# Patient Record
Sex: FEMALE | Race: WHITE | NOT HISPANIC OR LATINO | Employment: OTHER | URBAN - METROPOLITAN AREA
[De-identification: names, ages, dates, MRNs, and addresses within clinical notes are randomized per-mention and may not be internally consistent; named-entity substitution may affect disease eponyms.]

---

## 2024-06-09 ENCOUNTER — APPOINTMENT (OUTPATIENT)
Dept: RADIOLOGY | Facility: HOSPITAL | Age: 74
DRG: 087 | End: 2024-06-09
Payer: MEDICARE

## 2024-06-09 ENCOUNTER — HOSPITAL ENCOUNTER (INPATIENT)
Facility: HOSPITAL | Age: 74
LOS: 1 days | Discharge: NURSING, REHAB OR LONG-TERM CARE | DRG: 087 | End: 2024-06-11
Attending: EMERGENCY MEDICINE | Admitting: INTERNAL MEDICINE
Payer: MEDICARE

## 2024-06-09 ENCOUNTER — APPOINTMENT (OUTPATIENT)
Dept: CARDIOLOGY | Facility: HOSPITAL | Age: 74
DRG: 087 | End: 2024-06-09
Payer: MEDICARE

## 2024-06-09 DIAGNOSIS — S01.01XA LACERATION OF SCALP, INITIAL ENCOUNTER: ICD-10-CM

## 2024-06-09 DIAGNOSIS — S02.19XA CLOSED FRACTURE OF TEMPORAL BONE, INITIAL ENCOUNTER (MULTI): ICD-10-CM

## 2024-06-09 DIAGNOSIS — S01.81XA CHIN LACERATION, INITIAL ENCOUNTER: ICD-10-CM

## 2024-06-09 DIAGNOSIS — S02.119A FRACTURE OF OCCIPITAL BONE OF SKULL WITH LOSS OF CONSCIOUSNESS (MULTI): ICD-10-CM

## 2024-06-09 DIAGNOSIS — R55 SYNCOPE, UNSPECIFIED SYNCOPE TYPE: ICD-10-CM

## 2024-06-09 DIAGNOSIS — S06.5X1A: ICD-10-CM

## 2024-06-09 DIAGNOSIS — S06.6X1S: ICD-10-CM

## 2024-06-09 DIAGNOSIS — S06.9X9A FRACTURE OF OCCIPITAL BONE OF SKULL WITH LOSS OF CONSCIOUSNESS (MULTI): ICD-10-CM

## 2024-06-09 DIAGNOSIS — I10 PRIMARY HYPERTENSION: ICD-10-CM

## 2024-06-09 DIAGNOSIS — S06.6X1A TRAUMATIC SUBARACHNOID HEMORRHAGE WITH LOSS OF CONSCIOUSNESS OF 30 MINUTES OR LESS, INITIAL ENCOUNTER (MULTI): Primary | ICD-10-CM

## 2024-06-09 LAB
ABO GROUP (TYPE) IN BLOOD: NORMAL
ALBUMIN SERPL BCP-MCNC: 3.1 G/DL (ref 3.4–5)
ALP SERPL-CCNC: 51 U/L (ref 33–136)
ALT SERPL W P-5'-P-CCNC: 12 U/L (ref 7–45)
ANION GAP SERPL CALC-SCNC: 12 MMOL/L (ref 10–20)
ANTIBODY SCREEN: NORMAL
AST SERPL W P-5'-P-CCNC: 16 U/L (ref 9–39)
BASOPHILS # BLD AUTO: 0.06 X10*3/UL (ref 0–0.1)
BASOPHILS NFR BLD AUTO: 0.5 %
BILIRUB SERPL-MCNC: 0.3 MG/DL (ref 0–1.2)
BUN SERPL-MCNC: 24 MG/DL (ref 6–23)
CALCIUM SERPL-MCNC: 8.5 MG/DL (ref 8.6–10.3)
CARDIAC TROPONIN I PNL SERPL HS: 4 NG/L (ref 0–13)
CHLORIDE SERPL-SCNC: 105 MMOL/L (ref 98–107)
CO2 SERPL-SCNC: 23 MMOL/L (ref 21–32)
CREAT SERPL-MCNC: 1.08 MG/DL (ref 0.5–1.05)
EGFRCR SERPLBLD CKD-EPI 2021: 54 ML/MIN/1.73M*2
EOSINOPHIL # BLD AUTO: 0.27 X10*3/UL (ref 0–0.4)
EOSINOPHIL NFR BLD AUTO: 2.2 %
ERYTHROCYTE [DISTWIDTH] IN BLOOD BY AUTOMATED COUNT: 15.7 % (ref 11.5–14.5)
ETHANOL SERPL-MCNC: <10 MG/DL
GLUCOSE BLD MANUAL STRIP-MCNC: 161 MG/DL (ref 74–99)
GLUCOSE BLD MANUAL STRIP-MCNC: 213 MG/DL (ref 74–99)
GLUCOSE SERPL-MCNC: 174 MG/DL (ref 74–99)
HCT VFR BLD AUTO: 32.1 % (ref 36–46)
HGB BLD-MCNC: 9.9 G/DL (ref 12–16)
IMM GRANULOCYTES # BLD AUTO: 0.11 X10*3/UL (ref 0–0.5)
IMM GRANULOCYTES NFR BLD AUTO: 0.9 % (ref 0–0.9)
INR PPP: 1.1 (ref 0.9–1.1)
LACTATE SERPL-SCNC: 1.9 MMOL/L (ref 0.4–2)
LACTATE SERPL-SCNC: 3 MMOL/L (ref 0.4–2)
LYMPHOCYTES # BLD AUTO: 1.28 X10*3/UL (ref 0.8–3)
LYMPHOCYTES NFR BLD AUTO: 10.3 %
MCH RBC QN AUTO: 26.3 PG (ref 26–34)
MCHC RBC AUTO-ENTMCNC: 30.8 G/DL (ref 32–36)
MCV RBC AUTO: 85 FL (ref 80–100)
MONOCYTES # BLD AUTO: 0.86 X10*3/UL (ref 0.05–0.8)
MONOCYTES NFR BLD AUTO: 6.9 %
NEUTROPHILS # BLD AUTO: 9.86 X10*3/UL (ref 1.6–5.5)
NEUTROPHILS NFR BLD AUTO: 79.2 %
NRBC BLD-RTO: 0 /100 WBCS (ref 0–0)
PLATELET # BLD AUTO: 215 X10*3/UL (ref 150–450)
POTASSIUM SERPL-SCNC: 4.4 MMOL/L (ref 3.5–5.3)
PROT SERPL-MCNC: 5.4 G/DL (ref 6.4–8.2)
PROTHROMBIN TIME: 12.3 SECONDS (ref 9.8–12.8)
RBC # BLD AUTO: 3.76 X10*6/UL (ref 4–5.2)
RH FACTOR (ANTIGEN D): NORMAL
SODIUM SERPL-SCNC: 136 MMOL/L (ref 136–145)
WBC # BLD AUTO: 12.4 X10*3/UL (ref 4.4–11.3)

## 2024-06-09 PROCEDURE — 72131 CT LUMBAR SPINE W/O DYE: CPT

## 2024-06-09 PROCEDURE — 72131 CT LUMBAR SPINE W/O DYE: CPT | Performed by: RADIOLOGY

## 2024-06-09 PROCEDURE — 99291 CRITICAL CARE FIRST HOUR: CPT

## 2024-06-09 PROCEDURE — 83605 ASSAY OF LACTIC ACID: CPT | Mod: 91 | Performed by: EMERGENCY MEDICINE

## 2024-06-09 PROCEDURE — G0378 HOSPITAL OBSERVATION PER HR: HCPCS

## 2024-06-09 PROCEDURE — 96366 THER/PROPH/DIAG IV INF ADDON: CPT | Mod: 59

## 2024-06-09 PROCEDURE — 82947 ASSAY GLUCOSE BLOOD QUANT: CPT | Mod: 91

## 2024-06-09 PROCEDURE — 2550000001 HC RX 255 CONTRASTS: Performed by: EMERGENCY MEDICINE

## 2024-06-09 PROCEDURE — 96365 THER/PROPH/DIAG IV INF INIT: CPT | Mod: 59

## 2024-06-09 PROCEDURE — 0HQ0XZZ REPAIR SCALP SKIN, EXTERNAL APPROACH: ICD-10-PCS

## 2024-06-09 PROCEDURE — 93005 ELECTROCARDIOGRAM TRACING: CPT

## 2024-06-09 PROCEDURE — 96361 HYDRATE IV INFUSION ADD-ON: CPT | Mod: 59

## 2024-06-09 PROCEDURE — 80053 COMPREHEN METABOLIC PANEL: CPT

## 2024-06-09 PROCEDURE — 72125 CT NECK SPINE W/O DYE: CPT | Performed by: RADIOLOGY

## 2024-06-09 PROCEDURE — 2500000004 HC RX 250 GENERAL PHARMACY W/ HCPCS (ALT 636 FOR OP/ED)

## 2024-06-09 PROCEDURE — 83605 ASSAY OF LACTIC ACID: CPT

## 2024-06-09 PROCEDURE — 85025 COMPLETE CBC W/AUTO DIFF WBC: CPT

## 2024-06-09 PROCEDURE — 84484 ASSAY OF TROPONIN QUANT: CPT

## 2024-06-09 PROCEDURE — 36415 COLL VENOUS BLD VENIPUNCTURE: CPT | Performed by: EMERGENCY MEDICINE

## 2024-06-09 PROCEDURE — 82077 ASSAY SPEC XCP UR&BREATH IA: CPT

## 2024-06-09 PROCEDURE — 72125 CT NECK SPINE W/O DYE: CPT

## 2024-06-09 PROCEDURE — 90471 IMMUNIZATION ADMIN: CPT

## 2024-06-09 PROCEDURE — 70450 CT HEAD/BRAIN W/O DYE: CPT

## 2024-06-09 PROCEDURE — 72128 CT CHEST SPINE W/O DYE: CPT

## 2024-06-09 PROCEDURE — 90715 TDAP VACCINE 7 YRS/> IM: CPT

## 2024-06-09 PROCEDURE — 85610 PROTHROMBIN TIME: CPT

## 2024-06-09 PROCEDURE — 72128 CT CHEST SPINE W/O DYE: CPT | Performed by: RADIOLOGY

## 2024-06-09 PROCEDURE — 2500000005 HC RX 250 GENERAL PHARMACY W/O HCPCS

## 2024-06-09 PROCEDURE — 83036 HEMOGLOBIN GLYCOSYLATED A1C: CPT | Mod: STJLAB | Performed by: EMERGENCY MEDICINE

## 2024-06-09 PROCEDURE — 12011 RPR F/E/E/N/L/M 2.5 CM/<: CPT

## 2024-06-09 PROCEDURE — 70450 CT HEAD/BRAIN W/O DYE: CPT | Performed by: RADIOLOGY

## 2024-06-09 PROCEDURE — 86850 RBC ANTIBODY SCREEN: CPT

## 2024-06-09 PROCEDURE — 36415 COLL VENOUS BLD VENIPUNCTURE: CPT

## 2024-06-09 PROCEDURE — 71260 CT THORAX DX C+: CPT | Performed by: RADIOLOGY

## 2024-06-09 PROCEDURE — 71260 CT THORAX DX C+: CPT

## 2024-06-09 PROCEDURE — 12002 RPR S/N/AX/GEN/TRNK2.6-7.5CM: CPT

## 2024-06-09 PROCEDURE — 74177 CT ABD & PELVIS W/CONTRAST: CPT | Performed by: RADIOLOGY

## 2024-06-09 PROCEDURE — 0HQ1XZZ REPAIR FACE SKIN, EXTERNAL APPROACH: ICD-10-PCS

## 2024-06-09 PROCEDURE — 96375 TX/PRO/DX INJ NEW DRUG ADDON: CPT | Mod: 59

## 2024-06-09 PROCEDURE — G0390 TRAUMA RESPONS W/HOSP CRITI: HCPCS

## 2024-06-09 RX ORDER — PANTOPRAZOLE SODIUM 40 MG/1
40 TABLET, DELAYED RELEASE ORAL
Status: DISCONTINUED | OUTPATIENT
Start: 2024-06-10 | End: 2024-06-11 | Stop reason: HOSPADM

## 2024-06-09 RX ORDER — FLUTICASONE PROPIONATE 50 MCG
1 SPRAY, SUSPENSION (ML) NASAL DAILY PRN
Status: DISCONTINUED | OUTPATIENT
Start: 2024-06-09 | End: 2024-06-11 | Stop reason: HOSPADM

## 2024-06-09 RX ORDER — FLUTICASONE PROPIONATE 50 MCG
1 SPRAY, SUSPENSION (ML) NASAL DAILY PRN
COMMUNITY

## 2024-06-09 RX ORDER — BIOTIN 1 MG
1000 TABLET ORAL DAILY
Status: DISCONTINUED | OUTPATIENT
Start: 2024-06-10 | End: 2024-06-09

## 2024-06-09 RX ORDER — ASPIRIN 81 MG/1
81 TABLET ORAL DAILY
Status: DISCONTINUED | OUTPATIENT
Start: 2024-06-10 | End: 2024-06-10

## 2024-06-09 RX ORDER — CELECOXIB 200 MG/1
200 CAPSULE ORAL DAILY
Status: DISCONTINUED | OUTPATIENT
Start: 2024-06-10 | End: 2024-06-10

## 2024-06-09 RX ORDER — DEXTROSE 50 % IN WATER (D50W) INTRAVENOUS SYRINGE
25
Status: DISCONTINUED | OUTPATIENT
Start: 2024-06-09 | End: 2024-06-11 | Stop reason: HOSPADM

## 2024-06-09 RX ORDER — DEXTROSE 50 % IN WATER (D50W) INTRAVENOUS SYRINGE
12.5
Status: DISCONTINUED | OUTPATIENT
Start: 2024-06-09 | End: 2024-06-11 | Stop reason: HOSPADM

## 2024-06-09 RX ORDER — CITALOPRAM 20 MG/1
10 TABLET, FILM COATED ORAL DAILY
Status: DISCONTINUED | OUTPATIENT
Start: 2024-06-10 | End: 2024-06-11 | Stop reason: HOSPADM

## 2024-06-09 RX ORDER — LANOLIN ALCOHOL/MO/W.PET/CERES
100 CREAM (GRAM) TOPICAL DAILY
Status: DISCONTINUED | OUTPATIENT
Start: 2024-06-10 | End: 2024-06-11 | Stop reason: HOSPADM

## 2024-06-09 RX ORDER — LANOLIN ALCOHOL/MO/W.PET/CERES
1000 CREAM (GRAM) TOPICAL DAILY
Status: DISCONTINUED | OUTPATIENT
Start: 2024-06-10 | End: 2024-06-11 | Stop reason: HOSPADM

## 2024-06-09 RX ORDER — ATORVASTATIN CALCIUM 40 MG/1
40 TABLET, FILM COATED ORAL NIGHTLY
COMMUNITY

## 2024-06-09 RX ORDER — CITALOPRAM 10 MG/1
10 TABLET ORAL DAILY
COMMUNITY

## 2024-06-09 RX ORDER — FLUOROURACIL 50 MG/G
1 CREAM TOPICAL 2 TIMES DAILY
Status: DISCONTINUED | OUTPATIENT
Start: 2024-06-09 | End: 2024-06-11 | Stop reason: HOSPADM

## 2024-06-09 RX ORDER — LOSARTAN POTASSIUM 100 MG/1
100 TABLET ORAL DAILY
COMMUNITY

## 2024-06-09 RX ORDER — POLYETHYLENE GLYCOL 3350 17 G/17G
17 POWDER, FOR SOLUTION ORAL DAILY
Status: DISCONTINUED | OUTPATIENT
Start: 2024-06-10 | End: 2024-06-11 | Stop reason: HOSPADM

## 2024-06-09 RX ORDER — ATORVASTATIN CALCIUM 40 MG/1
40 TABLET, FILM COATED ORAL NIGHTLY
Status: DISCONTINUED | OUTPATIENT
Start: 2024-06-09 | End: 2024-06-11 | Stop reason: HOSPADM

## 2024-06-09 RX ORDER — METFORMIN HYDROCHLORIDE 500 MG/1
500 TABLET ORAL
Status: DISCONTINUED | OUTPATIENT
Start: 2024-06-10 | End: 2024-06-10

## 2024-06-09 RX ORDER — NICOTINE POLACRILEX 2 MG
1 GUM BUCCAL DAILY
COMMUNITY

## 2024-06-09 RX ORDER — ZONISAMIDE 100 MG/1
100 CAPSULE ORAL DAILY
Status: DISCONTINUED | OUTPATIENT
Start: 2024-06-10 | End: 2024-06-11 | Stop reason: HOSPADM

## 2024-06-09 RX ORDER — ACETAMINOPHEN 325 MG/1
650 TABLET ORAL EVERY 6 HOURS PRN
Status: DISCONTINUED | OUTPATIENT
Start: 2024-06-09 | End: 2024-06-11 | Stop reason: HOSPADM

## 2024-06-09 RX ORDER — LANOLIN ALCOHOL/MO/W.PET/CERES
1000 CREAM (GRAM) TOPICAL DAILY
COMMUNITY

## 2024-06-09 RX ORDER — LABETALOL HYDROCHLORIDE 5 MG/ML
10 INJECTION, SOLUTION INTRAVENOUS ONCE
Status: COMPLETED | OUTPATIENT
Start: 2024-06-09 | End: 2024-06-09

## 2024-06-09 RX ORDER — LANOLIN ALCOHOL/MO/W.PET/CERES
100 CREAM (GRAM) TOPICAL DAILY
COMMUNITY

## 2024-06-09 RX ORDER — LABETALOL HYDROCHLORIDE 5 MG/ML
INJECTION, SOLUTION INTRAVENOUS
Status: COMPLETED
Start: 2024-06-09 | End: 2024-06-09

## 2024-06-09 RX ORDER — METFORMIN HYDROCHLORIDE 500 MG/1
500 TABLET ORAL
COMMUNITY

## 2024-06-09 RX ORDER — INSULIN LISPRO 100 [IU]/ML
0-5 INJECTION, SOLUTION INTRAVENOUS; SUBCUTANEOUS EVERY 4 HOURS
Status: DISCONTINUED | OUTPATIENT
Start: 2024-06-09 | End: 2024-06-11 | Stop reason: HOSPADM

## 2024-06-09 RX ORDER — ZONISAMIDE 100 MG/1
100 CAPSULE ORAL DAILY
COMMUNITY

## 2024-06-09 RX ORDER — SODIUM CHLORIDE, SODIUM LACTATE, POTASSIUM CHLORIDE, CALCIUM CHLORIDE 600; 310; 30; 20 MG/100ML; MG/100ML; MG/100ML; MG/100ML
100 INJECTION, SOLUTION INTRAVENOUS CONTINUOUS
Status: ACTIVE | OUTPATIENT
Start: 2024-06-09 | End: 2024-06-10

## 2024-06-09 RX ORDER — LIDOCAINE 560 MG/1
1 PATCH PERCUTANEOUS; TOPICAL; TRANSDERMAL DAILY
Status: DISCONTINUED | OUTPATIENT
Start: 2024-06-10 | End: 2024-06-11 | Stop reason: HOSPADM

## 2024-06-09 RX ORDER — CELECOXIB 200 MG/1
200 CAPSULE ORAL DAILY
COMMUNITY
End: 2024-06-11 | Stop reason: HOSPADM

## 2024-06-09 RX ORDER — ALLOPURINOL 300 MG/1
300 TABLET ORAL DAILY
COMMUNITY

## 2024-06-09 RX ORDER — ALLOPURINOL 300 MG/1
300 TABLET ORAL DAILY
Status: DISCONTINUED | OUTPATIENT
Start: 2024-06-10 | End: 2024-06-10

## 2024-06-09 RX ORDER — RABEPRAZOLE SODIUM 20 MG/1
20 TABLET, DELAYED RELEASE ORAL
COMMUNITY

## 2024-06-09 RX ORDER — LIDOCAINE HYDROCHLORIDE AND EPINEPHRINE 10; 10 MG/ML; UG/ML
10 INJECTION, SOLUTION INFILTRATION; PERINEURAL ONCE
Status: COMPLETED | OUTPATIENT
Start: 2024-06-09 | End: 2024-06-09

## 2024-06-09 RX ORDER — NICARDIPINE HYDROCHLORIDE 0.2 MG/ML
2.5-15 INJECTION INTRAVENOUS CONTINUOUS
Status: DISCONTINUED | OUTPATIENT
Start: 2024-06-09 | End: 2024-06-10

## 2024-06-09 RX ORDER — ASPIRIN 81 MG/1
81 TABLET ORAL DAILY
COMMUNITY
End: 2024-06-11 | Stop reason: HOSPADM

## 2024-06-09 RX ORDER — ONDANSETRON 4 MG/1
4 TABLET, FILM COATED ORAL ONCE
Status: COMPLETED | OUTPATIENT
Start: 2024-06-10 | End: 2024-06-09

## 2024-06-09 RX ORDER — FLUOROURACIL 50 MG/G
1 CREAM TOPICAL 2 TIMES DAILY
COMMUNITY
Start: 2024-06-04 | End: 2024-06-17

## 2024-06-09 RX ORDER — ONDANSETRON HYDROCHLORIDE 2 MG/ML
4 INJECTION, SOLUTION INTRAVENOUS ONCE
Status: COMPLETED | OUTPATIENT
Start: 2024-06-10 | End: 2024-06-09

## 2024-06-09 RX ORDER — LOSARTAN POTASSIUM 100 MG/1
100 TABLET ORAL DAILY
Status: DISCONTINUED | OUTPATIENT
Start: 2024-06-10 | End: 2024-06-11 | Stop reason: HOSPADM

## 2024-06-09 RX ADMIN — NICARDIPINE HYDROCHLORIDE 2.5 MG/HR: 0.2 INJECTION, SOLUTION INTRAVENOUS at 19:00

## 2024-06-09 RX ADMIN — LABETALOL HYDROCHLORIDE 10 MG: 5 INJECTION, SOLUTION INTRAVENOUS at 18:07

## 2024-06-09 RX ADMIN — LIDOCAINE HYDROCHLORIDE,EPINEPHRINE BITARTRATE 10 ML: 10; .01 INJECTION, SOLUTION INFILTRATION; PERINEURAL at 17:46

## 2024-06-09 RX ADMIN — LABETALOL HYDROCHLORIDE 10 MG: 5 INJECTION, SOLUTION INTRAVENOUS at 18:23

## 2024-06-09 RX ADMIN — SODIUM CHLORIDE, POTASSIUM CHLORIDE, SODIUM LACTATE AND CALCIUM CHLORIDE 100 ML/HR: 600; 310; 30; 20 INJECTION, SOLUTION INTRAVENOUS at 22:56

## 2024-06-09 RX ADMIN — ONDANSETRON 4 MG: 2 INJECTION INTRAMUSCULAR; INTRAVENOUS at 23:55

## 2024-06-09 RX ADMIN — SODIUM CHLORIDE, POTASSIUM CHLORIDE, SODIUM LACTATE AND CALCIUM CHLORIDE 100 ML/HR: 600; 310; 30; 20 INJECTION, SOLUTION INTRAVENOUS at 18:24

## 2024-06-09 RX ADMIN — IOHEXOL 100 ML: 350 INJECTION, SOLUTION INTRAVENOUS at 17:36

## 2024-06-09 RX ADMIN — TETANUS TOXOID, REDUCED DIPHTHERIA TOXOID AND ACELLULAR PERTUSSIS VACCINE, ADSORBED 0.5 ML: 5; 2.5; 8; 8; 2.5 SUSPENSION INTRAMUSCULAR at 17:45

## 2024-06-09 SDOH — SOCIAL STABILITY: SOCIAL INSECURITY: ARE YOU OR HAVE YOU BEEN THREATENED OR ABUSED PHYSICALLY, EMOTIONALLY, OR SEXUALLY BY ANYONE?: NO

## 2024-06-09 SDOH — SOCIAL STABILITY: SOCIAL INSECURITY: DO YOU FEEL UNSAFE GOING BACK TO THE PLACE WHERE YOU ARE LIVING?: NO

## 2024-06-09 SDOH — SOCIAL STABILITY: SOCIAL INSECURITY: DO YOU FEEL ANYONE HAS EXPLOITED OR TAKEN ADVANTAGE OF YOU FINANCIALLY OR OF YOUR PERSONAL PROPERTY?: YES

## 2024-06-09 SDOH — SOCIAL STABILITY: SOCIAL INSECURITY: HAVE YOU HAD ANY THOUGHTS OF HARMING ANYONE ELSE?: NO

## 2024-06-09 SDOH — SOCIAL STABILITY: SOCIAL INSECURITY: ARE THERE ANY APPARENT SIGNS OF INJURIES/BEHAVIORS THAT COULD BE RELATED TO ABUSE/NEGLECT?: NO

## 2024-06-09 SDOH — SOCIAL STABILITY: SOCIAL INSECURITY: HAS ANYONE EVER THREATENED TO HURT YOUR FAMILY OR YOUR PETS?: NO

## 2024-06-09 SDOH — SOCIAL STABILITY: SOCIAL INSECURITY: DOES ANYONE TRY TO KEEP YOU FROM HAVING/CONTACTING OTHER FRIENDS OR DOING THINGS OUTSIDE YOUR HOME?: NO

## 2024-06-09 SDOH — SOCIAL STABILITY: SOCIAL INSECURITY: ABUSE: ADULT

## 2024-06-09 SDOH — SOCIAL STABILITY: SOCIAL INSECURITY: HAVE YOU HAD THOUGHTS OF HARMING ANYONE ELSE?: NO

## 2024-06-09 ASSESSMENT — ACTIVITIES OF DAILY LIVING (ADL)
GROOMING: INDEPENDENT
ADEQUATE_TO_COMPLETE_ADL: NO
HEARING - LEFT EAR: DIFFICULTY WITH NOISE
BATHING: INDEPENDENT
LACK_OF_TRANSPORTATION: NO
HEARING - RIGHT EAR: DIFFICULTY WITH NOISE
DRESSING YOURSELF: INDEPENDENT
WALKS IN HOME: INDEPENDENT
TOILETING: INDEPENDENT
JUDGMENT_ADEQUATE_SAFELY_COMPLETE_DAILY_ACTIVITIES: YES
PATIENT'S MEMORY ADEQUATE TO SAFELY COMPLETE DAILY ACTIVITIES?: YES
FEEDING YOURSELF: INDEPENDENT

## 2024-06-09 ASSESSMENT — LIFESTYLE VARIABLES
HOW OFTEN DO YOU HAVE 6 OR MORE DRINKS ON ONE OCCASION: NEVER
AUDIT-C TOTAL SCORE: 1
HAVE YOU EVER FELT YOU SHOULD CUT DOWN ON YOUR DRINKING: NO
HAVE PEOPLE ANNOYED YOU BY CRITICIZING YOUR DRINKING: NO
TOTAL SCORE: 0
AUDIT-C TOTAL SCORE: 1
HOW OFTEN DO YOU HAVE A DRINK CONTAINING ALCOHOL: MONTHLY OR LESS
EVER HAD A DRINK FIRST THING IN THE MORNING TO STEADY YOUR NERVES TO GET RID OF A HANGOVER: NO
EVER FELT BAD OR GUILTY ABOUT YOUR DRINKING: NO
SKIP TO QUESTIONS 9-10: 1
HOW MANY STANDARD DRINKS CONTAINING ALCOHOL DO YOU HAVE ON A TYPICAL DAY: 1 OR 2

## 2024-06-09 ASSESSMENT — PATIENT HEALTH QUESTIONNAIRE - PHQ9
SUM OF ALL RESPONSES TO PHQ9 QUESTIONS 1 & 2: 2
1. LITTLE INTEREST OR PLEASURE IN DOING THINGS: SEVERAL DAYS
2. FEELING DOWN, DEPRESSED OR HOPELESS: SEVERAL DAYS

## 2024-06-09 ASSESSMENT — COGNITIVE AND FUNCTIONAL STATUS - GENERAL
MOVING TO AND FROM BED TO CHAIR: A LITTLE
WALKING IN HOSPITAL ROOM: A LITTLE
TURNING FROM BACK TO SIDE WHILE IN FLAT BAD: A LITTLE
MOBILITY SCORE: 18
PATIENT BASELINE BEDBOUND: NO
STANDING UP FROM CHAIR USING ARMS: A LITTLE
CLIMB 3 TO 5 STEPS WITH RAILING: A LITTLE
MOVING FROM LYING ON BACK TO SITTING ON SIDE OF FLAT BED WITH BEDRAILS: A LITTLE
DAILY ACTIVITIY SCORE: 24

## 2024-06-09 ASSESSMENT — PAIN SCALES - GENERAL
PAINLEVEL_OUTOF10: 0 - NO PAIN
PAINLEVEL_OUTOF10: 3
PAINLEVEL_OUTOF10: 0 - NO PAIN
PAINLEVEL_OUTOF10: 5 - MODERATE PAIN
PAINLEVEL_OUTOF10: 0 - NO PAIN
PAINLEVEL_OUTOF10: 0 - NO PAIN
PAINLEVEL_OUTOF10: 5 - MODERATE PAIN
PAINLEVEL_OUTOF10: 5 - MODERATE PAIN

## 2024-06-09 ASSESSMENT — PAIN DESCRIPTION - PAIN TYPE: TYPE: ACUTE PAIN

## 2024-06-09 ASSESSMENT — PAIN - FUNCTIONAL ASSESSMENT
PAIN_FUNCTIONAL_ASSESSMENT: 0-10

## 2024-06-09 ASSESSMENT — PAIN DESCRIPTION - DESCRIPTORS: DESCRIPTORS: ACHING

## 2024-06-09 ASSESSMENT — PAIN DESCRIPTION - LOCATION: LOCATION: HEAD

## 2024-06-09 NOTE — ED PROVIDER NOTES
HPI   Chief Complaint   Patient presents with   • Trauma       Patient is a 74-year-old female with diabetes, hypertension, hyperlipidemia, essential tremor presenting to the emergency department for a fall and head injury.  Patient was at a high school game walking up the [place].  She was several steps up from the ground-level when she fell backwards.  She landed on the concrete and struck her head.  She has a laceration to the back of her head.  Per EMS report, bystanders had told him that she lost consciousness for about 30 to 40 seconds.  She started vomiting for EMS.  They gave her Zofran via IV.  She is oriented only to self.  Per report, she is oriented x 4 at baseline.  She does not remember what happened.  She is not sure if her medical history.      History provided by:  Patient, EMS personnel and medical records                      [place] Coma Scale Score: 14                     Patient History   No past medical history on file.  Past Surgical History:   Procedure Laterality Date   • MR HEAD ANGIO WO IV CONTRAST  4/26/2021    MR HEAD ANGIO WO IV CONTRAST 4/26/2021 [facility] EMERGENCY LEGACY   • MR NECK ANGIO WO IV CONTRAST  4/26/2021    MR NECK ANGIO WO IV CONTRAST 4/26/2021 [facility] EMERGENCY LEGACY     No family history on file.  Social History     Tobacco Use   • Smoking status: Not on file   • Smokeless tobacco: Not on file   Substance Use Topics   • Alcohol use: Not on file   • Drug use: Not on file       Physical Exam   ED Triage Vitals [06/09/24 1714]   Temperature Heart Rate Respirations BP   36.2 °C (97.2 °F) 77 18 (!) 196/119      Pulse Ox Temp src Heart Rate Source Patient Position   97 % -- -- --      BP Location FiO2 (%)     -- --       Physical Exam  Vitals and nursing note reviewed.   Constitutional:       General: She is not in acute distress.     Appearance: She is well-developed.   HENT:      Head:      Comments: Large posterior scalp laceration.  Bleeding controlled.     Right Ear: External  ear normal.      Left Ear: External ear normal.      Ears:      Comments: Right ear is obscured by the cervical collar.  Left ear has congealed blood around the external ear canal.     Nose: Nose normal.      Mouth/Throat:      Mouth: Mucous membranes are moist.   Eyes:      General: No scleral icterus.     Extraocular Movements: Extraocular movements intact.      Conjunctiva/sclera: Conjunctivae normal.      Pupils: Pupils are equal, round, and reactive to light.   Neck:      Comments: Cervical spine is in a c-collar.  Cardiovascular:      Rate and Rhythm: Normal rate and regular rhythm.      Heart sounds: No murmur heard.  Pulmonary:      Effort: Pulmonary effort is normal. No respiratory distress.      Breath sounds: Normal breath sounds.   Abdominal:      Palpations: Abdomen is soft.      Tenderness: There is no abdominal tenderness.   Musculoskeletal:         General: No swelling.   Skin:     General: Skin is warm and dry.   Neurological:      Mental Status: She is alert. She is disoriented.      Comments: GCS 14 (-1 for mild confusion).  Following all commands, full range of motion of extremities.  Speaking full sentences without any slurred speech or dysarthria.   Psychiatric:         Mood and Affect: Mood normal.         ED Course & MDM   Diagnoses as of 06/09/24 2125   Traumatic subarachnoid hemorrhage with loss of consciousness of 30 minutes or less, initial encounter (Multi)   Traumatic subdural hemorrhage with loss of consciousness of 30 minutes or less, initial encounter (Multi)   Laceration of scalp, initial encounter   Chin laceration, initial encounter   Fracture of occipital bone of skull with loss of consciousness (Multi)   Closed fracture of temporal bone, initial encounter (Multi)       Medical Decision Making  Patient is a 74-year-old female presenting to the emergency department for head injury and fall several steps down the bleachers with associated loss of consciousness and vomiting.  She is  oriented only to self, reportedly oriented x 4 at baseline.  On arrival, she is afebrile and hemodynamically stable, although noted to be hypertensive.  Aside from the confusion, she does not have any focal neurological deficits.  There is a large laceration posterior scalp, but bleeding is controlled.  No other acute injuries are noted on physical examination.  This is a limited trauma as the patient had loss of consciousness with a head injury, but has a GCS of 14.  She is not on any blood thinners on report by EMS or on chart review of her medical record.  CT imaging of her head, CT L spine, as well as chest, abdomen, and pelvis was.  Tetanus is updated.  Patient reports that she is not in any significant pain.    CT of the head shows a small right frontal subarachnoid hemorrhage and trace subdural hemorrhage, nondisplaced fracture of the left occipital bone, nondisplaced fracture of the left temporal bone with partial fluid opacification of left mastoid air cell.  CT C-spine negative.  CT chest, abdomen, pelvis negative for any acute injuries.    We discussed the findings with Dr. Jerome Gibbs, neurosurgeon on-call.  He recommends admission to the ICU, repeat CT scan in the morning.  Goal systolic blood pressure under 160.  No intervention needed for the occipital skull fracture and can remain here at [place].    Discussed the temporal bone fracture with ENT, Dr. Frankel.  No acute intervention needed, but recommends careful evaluation of the facial nerve.  No indication for antibiotics.  Patient will need to follow-up with audiology outpatient.  No decrease in sensation of the patient's face.  Face and chin are symmetric.  No weakness in smile or closing of the eyes.    Given that this was a limited trauma, discussed this with the trauma surgeon on-call, Dr. Miles.  He reviewed the patient's case and imaging.  No additional recommendations from trauma surgery perspective.  Defers management of his injuries  to neurosurgery and ENT as already described.    Patient remained hypertensive with blood pressures in the 180s systolic despite labetalol pushes.  Patient was placed on a Cardene drip.    Patient has a small laceration on her right chin that was uncovered after cervical spine was cleared.  This was irrigated with sterile normal saline.  Two 5-0 fast-absorbing Chromic Gut sutures were placed in a simple running fashion.  She has approximately 4 cm laceration to her left posterior scalp.  Given that she has an intracranial hemorrhage and will need a repeat CT scan soon, sutures were placed rather than staples to avoid interference of the scan.  Laceration was irrigated with sterile water.  Three 4-0 Prolene sutures were used to repair the laceration in a simple interrupted fashion.  Patient's lacerations had been locally anesthetized with 1% lidocaine with epinephrine.  Patient tolerated these procedures well.  The scalp laceration will need to be removed in 7 to 10 days.    ICU attending, Dr. Pablo, accepted the patient to the unit.  Report was given to the ICU resident.    Diego Soto DO, PGY 3  Emergency Medicine Resident    Amount and/or Complexity of Data Reviewed  Labs: ordered.  Radiology: ordered. Decision-making details documented in ED Course.  ECG/medicine tests: ordered and independent interpretation performed. Decision-making details documented in ED Course.     Details: EKG at 1830 on 6/9/2024 as interpreted by myself: Ventricular rate 77, , QRS 80, QTc 459.  Normal sinus rhythm.  No acute injury pattern.        Procedure  Critical Care    Performed by: Diego Soto DO  Authorized by: Adarsh Rouse DO    Critical care provider statement:     Critical care time (minutes):  55    Critical care was necessary to treat or prevent imminent or life-threatening deterioration of the following conditions:  Trauma    Critical care was time spent personally by me on the following activities:   Development of treatment plan with patient or surrogate, discussions with consultants, discussions with primary provider, evaluation of patient's response to treatment, examination of patient, obtaining history from patient or surrogate, ordering and review of laboratory studies, ordering and performing treatments and interventions, ordering and review of radiographic studies, pulse oximetry, re-evaluation of patient's condition and review of old charts    Care discussed with: admitting provider    Laceration Repair    Performed by: Diego Soto DO  Authorized by: Adarsh Rouse DO    Consent:     Consent obtained:  Verbal    Consent given by:  Patient  Universal protocol:     Patient identity confirmed:  Verbally with patient  Anesthesia:     Anesthesia method:  Local infiltration    Local anesthetic:  Lidocaine 1% WITH epi  Laceration details:     Location:  Scalp    Scalp location:  Occipital    Length (cm):  4    Depth (mm):  4  Pre-procedure details:     Preparation:  Patient was prepped and draped in usual sterile fashion and imaging obtained to evaluate for foreign bodies  Exploration:     Hemostasis achieved with:  Direct pressure    Contaminated: no    Treatment:     Amount of cleaning:  Standard    Irrigation solution:  Sterile water  Skin repair:     Repair method:  Sutures    Suture size:  4-0    Suture material:  Prolene    Suture technique:  Simple interrupted    Number of sutures:  3  Approximation:     Approximation:  Close  Repair type:     Repair type:  Simple  Post-procedure details:     Dressing:  Open (no dressing)    Procedure completion:  Tolerated well, no immediate complications  Laceration Repair    Performed by: Diego Soto DO  Authorized by: Adarsh Rouse DO    Consent:     Consent obtained:  Verbal    Consent given by:  Patient  Universal protocol:     Patient identity confirmed:  Verbally with patient  Anesthesia:     Anesthesia method:  Local infiltration    Local anesthetic:   Lidocaine 1% WITH epi  Laceration details:     Location:  Face    Face location:  Chin    Length (cm):  1.5    Depth (mm):  4  Pre-procedure details:     Preparation:  Patient was prepped and draped in usual sterile fashion and imaging obtained to evaluate for foreign bodies  Treatment:     Amount of cleaning:  Standard    Irrigation solution:  Sterile saline  Skin repair:     Repair method:  Sutures    Suture size:  5-0    Suture material:  Fast-absorbing gut    Number of sutures:  2  Approximation:     Approximation:  Close  Repair type:     Repair type:  Simple  Post-procedure details:     Dressing:  Adhesive bandage    Procedure completion:  Tolerated well, no immediate complications       Diego Soto DO  Resident  06/09/24 1006

## 2024-06-09 NOTE — ED NOTES
Per conversation with provider, will give fluids some time before redrawing lactate acid. Will redraw after 200cc of fluid have been administered per Dr Soto.     Emerald Horvath RN  06/09/24 3573       Emerald Horvath RN  06/09/24 9079

## 2024-06-10 ENCOUNTER — APPOINTMENT (OUTPATIENT)
Dept: RADIOLOGY | Facility: HOSPITAL | Age: 74
DRG: 087 | End: 2024-06-10
Payer: MEDICARE

## 2024-06-10 PROBLEM — S06.6X1A TRAUMATIC SUBARACHNOID HEMORRHAGE WITH LOSS OF CONSCIOUSNESS OF 30 MINUTES OR LESS, INITIAL ENCOUNTER (MULTI): Status: ACTIVE | Noted: 2024-06-10

## 2024-06-10 LAB
ALBUMIN SERPL BCP-MCNC: 3.5 G/DL (ref 3.4–5)
ALP SERPL-CCNC: 55 U/L (ref 33–136)
ALT SERPL W P-5'-P-CCNC: 11 U/L (ref 7–45)
ANION GAP SERPL CALC-SCNC: 14 MMOL/L (ref 10–20)
AST SERPL W P-5'-P-CCNC: 12 U/L (ref 9–39)
BASOPHILS # BLD AUTO: 0.05 X10*3/UL (ref 0–0.1)
BASOPHILS NFR BLD AUTO: 0.3 %
BILIRUB SERPL-MCNC: 0.5 MG/DL (ref 0–1.2)
BUN SERPL-MCNC: 21 MG/DL (ref 6–23)
CALCIUM SERPL-MCNC: 9.3 MG/DL (ref 8.6–10.3)
CHLORIDE SERPL-SCNC: 103 MMOL/L (ref 98–107)
CO2 SERPL-SCNC: 24 MMOL/L (ref 21–32)
CREAT SERPL-MCNC: 0.96 MG/DL (ref 0.5–1.05)
EGFRCR SERPLBLD CKD-EPI 2021: 63 ML/MIN/1.73M*2
EOSINOPHIL # BLD AUTO: 0.01 X10*3/UL (ref 0–0.4)
EOSINOPHIL NFR BLD AUTO: 0.1 %
ERYTHROCYTE [DISTWIDTH] IN BLOOD BY AUTOMATED COUNT: 15.7 % (ref 11.5–14.5)
EST. AVERAGE GLUCOSE BLD GHB EST-MCNC: 174 MG/DL
GLUCOSE BLD MANUAL STRIP-MCNC: 143 MG/DL (ref 74–99)
GLUCOSE BLD MANUAL STRIP-MCNC: 153 MG/DL (ref 74–99)
GLUCOSE BLD MANUAL STRIP-MCNC: 169 MG/DL (ref 74–99)
GLUCOSE BLD MANUAL STRIP-MCNC: 199 MG/DL (ref 74–99)
GLUCOSE BLD MANUAL STRIP-MCNC: 220 MG/DL (ref 74–99)
GLUCOSE SERPL-MCNC: 149 MG/DL (ref 74–99)
HBA1C MFR BLD: 7.7 %
HCT VFR BLD AUTO: 34.2 % (ref 36–46)
HGB BLD-MCNC: 10.8 G/DL (ref 12–16)
IMM GRANULOCYTES # BLD AUTO: 0.12 X10*3/UL (ref 0–0.5)
IMM GRANULOCYTES NFR BLD AUTO: 0.7 % (ref 0–0.9)
LYMPHOCYTES # BLD AUTO: 1.3 X10*3/UL (ref 0.8–3)
LYMPHOCYTES NFR BLD AUTO: 7.4 %
MAGNESIUM SERPL-MCNC: 1.46 MG/DL (ref 1.6–2.4)
MCH RBC QN AUTO: 26.9 PG (ref 26–34)
MCHC RBC AUTO-ENTMCNC: 31.6 G/DL (ref 32–36)
MCV RBC AUTO: 85 FL (ref 80–100)
MONOCYTES # BLD AUTO: 1.22 X10*3/UL (ref 0.05–0.8)
MONOCYTES NFR BLD AUTO: 7 %
NEUTROPHILS # BLD AUTO: 14.84 X10*3/UL (ref 1.6–5.5)
NEUTROPHILS NFR BLD AUTO: 84.5 %
NRBC BLD-RTO: 0 /100 WBCS (ref 0–0)
PHOSPHATE SERPL-MCNC: 4.3 MG/DL (ref 2.5–4.9)
PLATELET # BLD AUTO: 245 X10*3/UL (ref 150–450)
POTASSIUM SERPL-SCNC: 4.2 MMOL/L (ref 3.5–5.3)
PROT SERPL-MCNC: 6.1 G/DL (ref 6.4–8.2)
RBC # BLD AUTO: 4.02 X10*6/UL (ref 4–5.2)
SODIUM SERPL-SCNC: 137 MMOL/L (ref 136–145)
WBC # BLD AUTO: 17.5 X10*3/UL (ref 4.4–11.3)

## 2024-06-10 PROCEDURE — 70450 CT HEAD/BRAIN W/O DYE: CPT | Performed by: RADIOLOGY

## 2024-06-10 PROCEDURE — 97530 THERAPEUTIC ACTIVITIES: CPT | Mod: GO

## 2024-06-10 PROCEDURE — 97530 THERAPEUTIC ACTIVITIES: CPT | Mod: GP | Performed by: PHYSICAL THERAPIST

## 2024-06-10 PROCEDURE — 83735 ASSAY OF MAGNESIUM: CPT | Performed by: EMERGENCY MEDICINE

## 2024-06-10 PROCEDURE — 80053 COMPREHEN METABOLIC PANEL: CPT | Performed by: EMERGENCY MEDICINE

## 2024-06-10 PROCEDURE — 2500000001 HC RX 250 WO HCPCS SELF ADMINISTERED DRUGS (ALT 637 FOR MEDICARE OP): Performed by: EMERGENCY MEDICINE

## 2024-06-10 PROCEDURE — 82947 ASSAY GLUCOSE BLOOD QUANT: CPT | Mod: 91

## 2024-06-10 PROCEDURE — 36415 COLL VENOUS BLD VENIPUNCTURE: CPT | Performed by: EMERGENCY MEDICINE

## 2024-06-10 PROCEDURE — 97162 PT EVAL MOD COMPLEX 30 MIN: CPT | Mod: GP | Performed by: PHYSICAL THERAPIST

## 2024-06-10 PROCEDURE — 2500000005 HC RX 250 GENERAL PHARMACY W/O HCPCS: Performed by: EMERGENCY MEDICINE

## 2024-06-10 PROCEDURE — 70450 CT HEAD/BRAIN W/O DYE: CPT | Performed by: STUDENT IN AN ORGANIZED HEALTH CARE EDUCATION/TRAINING PROGRAM

## 2024-06-10 PROCEDURE — 99233 SBSQ HOSP IP/OBS HIGH 50: CPT | Performed by: INTERNAL MEDICINE

## 2024-06-10 PROCEDURE — 2060000001 HC INTERMEDIATE ICU ROOM DAILY

## 2024-06-10 PROCEDURE — 70450 CT HEAD/BRAIN W/O DYE: CPT

## 2024-06-10 PROCEDURE — 2500000004 HC RX 250 GENERAL PHARMACY W/ HCPCS (ALT 636 FOR OP/ED): Performed by: EMERGENCY MEDICINE

## 2024-06-10 PROCEDURE — 2500000002 HC RX 250 W HCPCS SELF ADMINISTERED DRUGS (ALT 637 FOR MEDICARE OP, ALT 636 FOR OP/ED): Performed by: EMERGENCY MEDICINE

## 2024-06-10 PROCEDURE — 99223 1ST HOSP IP/OBS HIGH 75: CPT | Performed by: PHYSICIAN ASSISTANT

## 2024-06-10 PROCEDURE — 85025 COMPLETE CBC W/AUTO DIFF WBC: CPT | Performed by: EMERGENCY MEDICINE

## 2024-06-10 PROCEDURE — 84100 ASSAY OF PHOSPHORUS: CPT | Performed by: EMERGENCY MEDICINE

## 2024-06-10 PROCEDURE — 97165 OT EVAL LOW COMPLEX 30 MIN: CPT | Mod: GO

## 2024-06-10 PROCEDURE — 99233 SBSQ HOSP IP/OBS HIGH 50: CPT | Performed by: STUDENT IN AN ORGANIZED HEALTH CARE EDUCATION/TRAINING PROGRAM

## 2024-06-10 RX ORDER — MAGNESIUM SULFATE HEPTAHYDRATE 40 MG/ML
2 INJECTION, SOLUTION INTRAVENOUS EVERY 6 HOURS PRN
Status: DISCONTINUED | OUTPATIENT
Start: 2024-06-10 | End: 2024-06-10

## 2024-06-10 RX ORDER — MAGNESIUM SULFATE HEPTAHYDRATE 40 MG/ML
4 INJECTION, SOLUTION INTRAVENOUS EVERY 6 HOURS PRN
Status: DISCONTINUED | OUTPATIENT
Start: 2024-06-10 | End: 2024-06-10

## 2024-06-10 RX ORDER — ONDANSETRON 4 MG/1
4 TABLET, FILM COATED ORAL EVERY 8 HOURS PRN
Status: DISCONTINUED | OUTPATIENT
Start: 2024-06-10 | End: 2024-06-11 | Stop reason: HOSPADM

## 2024-06-10 RX ORDER — ONDANSETRON HYDROCHLORIDE 2 MG/ML
4 INJECTION, SOLUTION INTRAVENOUS EVERY 8 HOURS PRN
Status: DISCONTINUED | OUTPATIENT
Start: 2024-06-10 | End: 2024-06-11 | Stop reason: HOSPADM

## 2024-06-10 RX ADMIN — ACETAMINOPHEN 650 MG: 325 TABLET ORAL at 19:36

## 2024-06-10 RX ADMIN — LIDOCAINE 4% 1 PATCH: 40 PATCH TOPICAL at 09:21

## 2024-06-10 RX ADMIN — ATORVASTATIN CALCIUM 40 MG: 40 TABLET, FILM COATED ORAL at 20:31

## 2024-06-10 RX ADMIN — ACETAMINOPHEN 650 MG: 325 TABLET ORAL at 04:49

## 2024-06-10 RX ADMIN — POLYETHYLENE GLYCOL 3350 17 G: 17 POWDER, FOR SOLUTION ORAL at 09:23

## 2024-06-10 RX ADMIN — SITAGLIPTIN 50 MG: 50 TABLET, FILM COATED ORAL at 09:23

## 2024-06-10 RX ADMIN — FLUOROURACIL 1 APPLICATION: 50 CREAM TOPICAL at 21:06

## 2024-06-10 RX ADMIN — THIAMINE HCL TAB 100 MG 100 MG: 100 TAB at 09:24

## 2024-06-10 RX ADMIN — CYANOCOBALAMIN TAB 1000 MCG 1000 MCG: 1000 TAB at 09:23

## 2024-06-10 RX ADMIN — INSULIN LISPRO 2 UNITS: 100 INJECTION, SOLUTION INTRAVENOUS; SUBCUTANEOUS at 20:33

## 2024-06-10 RX ADMIN — CITALOPRAM HYDROBROMIDE 10 MG: 20 TABLET ORAL at 09:24

## 2024-06-10 RX ADMIN — METFORMIN HYDROCHLORIDE 500 MG: 500 TABLET ORAL at 09:24

## 2024-06-10 RX ADMIN — LOSARTAN POTASSIUM 100 MG: 100 TABLET, FILM COATED ORAL at 09:24

## 2024-06-10 RX ADMIN — PANTOPRAZOLE SODIUM 40 MG: 40 TABLET, DELAYED RELEASE ORAL at 09:24

## 2024-06-10 RX ADMIN — INSULIN LISPRO 1 UNITS: 100 INJECTION, SOLUTION INTRAVENOUS; SUBCUTANEOUS at 04:46

## 2024-06-10 RX ADMIN — ACETAMINOPHEN 650 MG: 325 TABLET ORAL at 12:45

## 2024-06-10 ASSESSMENT — PAIN DESCRIPTION - LOCATION
LOCATION: HEAD
LOCATION: HEAD

## 2024-06-10 ASSESSMENT — COGNITIVE AND FUNCTIONAL STATUS - GENERAL
TURNING FROM BACK TO SIDE WHILE IN FLAT BAD: A LITTLE
DAILY ACTIVITIY SCORE: 24
DAILY ACTIVITIY SCORE: 17
DRESSING REGULAR UPPER BODY CLOTHING: A LITTLE
MOBILITY SCORE: 18
HELP NEEDED FOR BATHING: A LOT
MOVING FROM LYING ON BACK TO SITTING ON SIDE OF FLAT BED WITH BEDRAILS: A LITTLE
MOVING TO AND FROM BED TO CHAIR: A LITTLE
MOBILITY SCORE: 12
MOVING FROM LYING ON BACK TO SITTING ON SIDE OF FLAT BED WITH BEDRAILS: A LOT
TOILETING: A LITTLE
WALKING IN HOSPITAL ROOM: A LOT
PERSONAL GROOMING: A LITTLE
MOVING TO AND FROM BED TO CHAIR: A LOT
TURNING FROM BACK TO SIDE WHILE IN FLAT BAD: A LOT
CLIMB 3 TO 5 STEPS WITH RAILING: A LITTLE
STANDING UP FROM CHAIR USING ARMS: A LOT
STANDING UP FROM CHAIR USING ARMS: A LITTLE
DRESSING REGULAR LOWER BODY CLOTHING: A LOT
CLIMB 3 TO 5 STEPS WITH RAILING: A LOT
WALKING IN HOSPITAL ROOM: A LITTLE

## 2024-06-10 ASSESSMENT — PAIN - FUNCTIONAL ASSESSMENT
PAIN_FUNCTIONAL_ASSESSMENT: 0-10

## 2024-06-10 ASSESSMENT — PAIN SCALES - GENERAL
PAINLEVEL_OUTOF10: 8
PAINLEVEL_OUTOF10: 0 - NO PAIN
PAINLEVEL_OUTOF10: 5 - MODERATE PAIN
PAINLEVEL_OUTOF10: 3
PAINLEVEL_OUTOF10: 3
PAINLEVEL_OUTOF10: 0 - NO PAIN
PAINLEVEL_OUTOF10: 3

## 2024-06-10 ASSESSMENT — ACTIVITIES OF DAILY LIVING (ADL)
ADL_ASSISTANCE: INDEPENDENT
ADL_ASSISTANCE: INDEPENDENT

## 2024-06-10 ASSESSMENT — PAIN DESCRIPTION - ORIENTATION: ORIENTATION: LEFT

## 2024-06-10 NOTE — PROGRESS NOTES
"Tracy Leung is a 74 y.o. female on day 0 of admission presenting with Traumatic subarachnoid bleed with LOC of 30 minutes or less, sequela (CMS-HCC).    Subjective   Overnight, Cardene was started for blood pressure control, however at time of exam, Cardene is off       Objective     Physical Exam  Cardiovascular:      Rate and Rhythm: Normal rate and regular rhythm.      Pulses: Normal pulses.      Heart sounds: Normal heart sounds.   Abdominal:      General: Abdomen is flat.      Palpations: Abdomen is soft.   Skin:     General: Skin is warm and dry.      Capillary Refill: Capillary refill takes less than 2 seconds.   Neurological:      General: No focal deficit present.      Mental Status: She is oriented to person, place, and time.         Last Recorded Vitals  Blood pressure 116/63, pulse 82, temperature 36.6 °C (97.9 °F), temperature source Temporal, resp. rate 16, height 1.613 m (5' 3.5\"), weight 72.1 kg (158 lb 15.2 oz), SpO2 96%.  Intake/Output last 3 Shifts:  I/O last 3 completed shifts:  In: 1175.2 (16.3 mL/kg) [I.V.:1175.2 (16.3 mL/kg)]  Out: 400 (5.5 mL/kg) [Urine:400 (0.2 mL/kg/hr)]  Weight: 72.1 kg     Relevant Results           This patient currently has cardiac telemetry ordered; if you would like to modify or discontinue the telemetry order, click here to go to the orders activity to modify/discontinue the order.                 Assessment/Plan   Principal Problem:    Traumatic subarachnoid bleed with LOC of 30 minutes or less, sequela (CMS-HCC)  Active Problems:    Traumatic subarachnoid hemorrhage with loss of consciousness of 30 minutes or less, initial encounter (Multi)    This is a 74-year-old female patient with past medical history of diabetes, hypertension, hyperlipidemia admitted to ICU for traumatic subdural and subarachnoid hemorrhage that occurred after mechanical fall.  Admitted to ICU for close neuromonitoring and close blood pressure control    Neuro:   #Traumatic  R " frontoparietal SAH and SDH, closed occipital and L temporal fractures  -Repeat Head CT 6AM, showing some chagnes  -Home meds: citalopram 10qd, zonisamide 100qd  -GCS: 15, A/Ox4  -Pain: ASA (H), celebrex (H)  -Sedation: none  -[facility] ICU     Cardiac: no acute issues  - History: HTN, HLD  - Goals: BP<140/90  - Home meds: losartan 100qd, atorvastatin 40qd  - Last echo: LVEF 55-60   - Ordered new echo due to concern for syncope     Pulmonary: No acute issues  -History: none  Continuous pulse oximetry   O2 PRN to maintain SpO2 > 94%, wean as tolerated     Gastrointestinal: No acute issues  -History: gerd  -Home meds: protonix  - Diet: RD on consult, appreciate recs after passes bedside swallow  - Supplementation: B1, B12, B7 (held)  - Bowel regimen: miralax     Renal: no acute issues  - Daily RFP,Mg,Phos  Net IO Since Admission: No IO data has been entered for this period [24]       Results from last 72 hours   Lab Units 24  1750   BUN mg/dL 24*   CREATININE mg/dL 1.08*   - Electrolytes: Replete per protocol, goal K>4 Phos >3 Mg >2     Endocrine: No acute issues  -History: NIDDM  -Home meds: metformin, sitagliptin, semaglutide (H)  -sliding scale: 1        Results from last 7 days   Lab Units 24  2116 24  1750   POCT GLUCOSE mg/dL 213*  --    GLUCOSE mg/dL  --  174*      -BG < 180 goal     Hematology: no acute issues  - Daily CBC  -History: none       Results from last 7 days   Lab Units 24  1750   HEMOGLOBIN g/dL 9.9*   HEMATOCRIT % 32.1*   PLATELETS AUTO x10*3/uL 215   - DVT Prophylaxis: SCD only     Infectious Disease: No acute issues  -History: none       Results from last 7 days   Lab Units 24  1750   WBC AUTO x10*3/uL 12.4*      Temp (24hrs), Av.6 °C (97.8 °F), Min:36.2 °C (97.2 °F), Max:36.8 °C (98.2 °F)     Dispo: Downgrade to stepdown unit under medicine. Accepted by Dr Cheyenne Muir DO

## 2024-06-10 NOTE — PROGRESS NOTES
Tracy BravoDorothyBrant is a 74 y.o. female on day 0 of admission presenting with Traumatic subarachnoid bleed with LOC of 30 minutes or less, sequela (CMS-HCC).      Subjective   Patient is doing well complaining about pain in the neck area and headache       Objective     Last Recorded Vitals  /63   Pulse 82   Temp 36.6 °C (97.9 °F) (Temporal)   Resp 16   Wt 72.1 kg (158 lb 15.2 oz)   SpO2 96%   Intake/Output last 3 Shifts:    Intake/Output Summary (Last 24 hours) at 6/10/2024 1425  Last data filed at 6/10/2024 0542  Gross per 24 hour   Intake 1175.21 ml   Output 400 ml   Net 775.21 ml       Admission Weight  Weight: 72.6 kg (160 lb) (06/09/24 1714)    Daily Weight  06/09/24 : 72.1 kg (158 lb 15.2 oz)      Physical Exam:  General: Alert, mild distress  HEENT: PERRLA, trauma to the skull noted with dried blood  Neck: Normal to inspection  Lungs: Clear to auscultation, work of breathing within normal limit  Cardiac: Regular rate and rhythm  Abdomen: Soft nontender, positive bowel sounds  : Exam deferred  Skin: Intact.  Lidocaine patch noted around the neck area  Hematology: No petechia or excessive ecchymosis  Musculoskeletal: Without significant trauma  Neurological: Alert awake oriented, no focal deficit, cranial nerves grossly intact  Psych: No suicidal ideation or homicidal ideation    Relevant Results  Scheduled medications  [Held by provider] allopurinol, 300 mg, oral, Daily  [Held by provider] aspirin, 81 mg, oral, Daily  atorvastatin, 40 mg, oral, Nightly  [Held by provider] celecoxib, 200 mg, oral, Daily  citalopram, 10 mg, oral, Daily  cyanocobalamin, 1,000 mcg, oral, Daily  fluorouracil, 1 Application, Topical, BID  insulin lispro, 0-5 Units, subcutaneous, q4h  lidocaine, 1 patch, transdermal, Daily  losartan, 100 mg, oral, Daily  pantoprazole, 40 mg, oral, Daily before breakfast  perflutren lipid microspheres, 0.5-10 mL of dilution, intravenous, Once in imaging  perflutren protein A  microsphere, 0.5 mL, intravenous, Once in imaging  polyethylene glycol, 17 g, oral, Daily  SITagliptin phosphate, 50 mg, oral, Daily  sulfur hexafluoride microsphr, 2 mL, intravenous, Once in imaging  thiamine, 100 mg, oral, Daily  zonisamide, 100 mg, oral, Daily      Continuous medications     PRN medications  PRN medications: acetaminophen, dextrose, dextrose, fluticasone, glucagon, glucagon   Labs  Results from last 7 days   Lab Units 06/10/24  0658 06/09/24  1750   WBC AUTO x10*3/uL 17.5* 12.4*   HEMOGLOBIN g/dL 10.8* 9.9*   HEMATOCRIT % 34.2* 32.1*   PLATELETS AUTO x10*3/uL 245 215     Results from last 7 days   Lab Units 06/10/24  0658 06/09/24  1750   SODIUM mmol/L 137 136   POTASSIUM mmol/L 4.2 4.4   CHLORIDE mmol/L 103 105   CO2 mmol/L 24 23   BUN mg/dL 21 24*   CREATININE mg/dL 0.96 1.08*   CALCIUM mg/dL 9.3 8.5*   PROTEIN TOTAL g/dL 6.1* 5.4*   BILIRUBIN TOTAL mg/dL 0.5 0.3   ALK PHOS U/L 55 51   ALT U/L 11 12   AST U/L 12 16   GLUCOSE mg/dL 149* 174*       ECG 12 lead    Result Date: 6/10/2024  Normal sinus rhythm Normal ECG When compared with ECG of 26-APR-2021 16:28, No significant change was found    CT head wo IV contrast    Result Date: 6/10/2024  Interpreted By:  Kamron Lai, STUDY: CT HEAD WO IV CONTRAST;  6/10/2024 6:07 am   INDICATION: Signs/Symptoms:traumatic SAH/SDH stability scan.   COMPARISON: CT head from 06/09/2024. MRI brain from 04/26/2021.   ACCESSION NUMBER(S): [id number]   ORDERING CLINICIAN: SUSHMA ROONEY   TECHNIQUE: Noncontrast axial CT scan of head was performed. Angled reformats in brain and bone windows were generated. The images were reviewed in bone, brain, blood and soft tissue windows.   FINDINGS: Interval decrease in conspicuity of subarachnoid hemorrhage along the right frontal lobe. More conspicuous subarachnoid hemorrhage located within the right sylvian fissure. Stable multiple foci of subarachnoid versus parenchymal hemorrhage within the right temporal lobe. Small  focus of subarachnoid hemorrhage versus parenchymal hemorrhage within or along the right parasagittal frontal lobe is unchanged.   Probable trace subarachnoid hemorrhages located within the left sylvian fissure, more conspicuous in appearance.   Thin hypoattenuating subdural fluid collection along the right cerebral convexity which measures 5-6 mm in maximum thickness and has overall increased in extent and slightly in size as the maximum measurement measured approximately 4-5 mm. Associated mass effect on the right cerebral hemisphere is similar to prior and there is no striking midline shift.   There is trace subdural hemorrhage along the superior leaflet of the left tentorium cerebellum and left posterior interhemispheric falx.   Trace hemorrhage is seen within the layering dependent of the bilateral occipital horns.   No acute transcortical infarct.   Redemonstration of fracture involving the left posterior calvarium and there is overlying small amount of soft tissue air and scalp hematoma.       1. Compared to the recent CT head, some of the intracranial hemorrhages have decreased in conspicuity or are unchanged while other regions of extra-axial hemorrhages have slightly increased in conspicuity.   2. Associated mass effect is essentially unchanged.   3. Additional findings as above.   This study was interpreted at [facility].   MACRO: None   Signed by: Kamron Lai 6/10/2024 9:20 AM Dictation workstation:   [id number]    CT chest abdomen pelvis w IV contrast    Result Date: 6/9/2024  Interpreted By:  Karishma Martin, STUDY: CT CHEST ABDOMEN PELVIS W IV CONTRAST; CT THORACIC SPINE WO IV CONTRAST; CT LUMBAR SPINE WO IV CONTRAST;  6/9/2024 5:53 pm   INDICATION: Signs/Symptoms:Trauma. Fall down stairs, laceration to back of head, loss of consciousness and vomiting; Signs/Symptoms:Fall down stairs, head injury, head laceration, loss of consciousness, vomiting   COMPARISON:  Chest x-ray 04/26/2021. CT coronary artery calcium score 03/13/2019.   ACCESSION NUMBER(S): [id number]; [id number]; [id number]   ORDERING CLINICIAN: BRITTA ZHAO   TECHNIQUE: Axial CT of the chest, abdomen, and pelvis was obtained. Coronal and sagittal reconstructions were performed. Intravenous contrast material was administered without immediate complications. Multiplanar reformatted images of the thoracic spine and lumbar spine were obtained from concurrent CT chest/abdomen/pelvis.   FINDINGS: There is motion artifact. There is streak artifact from the patient's arms along the body and superimposed radiopaque densities.   CHEST:   LUNG/PLEURA/LARGE AIRWAYS: The trachea and the central airways are patent. There is biapical pleural scarring. No consolidation, pleural effusion or pneumothorax.   VESSELS: No traumatic aortic injury is appreciated within the limitations of this non-EKG gated study.  The thoracic aorta is of normal course and caliber.     HEART: The heart is normal in size.   There is no pericardial effusion.   MEDIASTINUM AND MORENITA: Small hypodense nodules at the visualized thyroid gland, the largest measures 8 mm. No pneumomediastinum, abnormal mediastinal fluid collection or mediastinal hematoma are appreciated.  No mediastinal, hilar or biaxillary adenopathy is present.   There is a small hiatal hernia.   CHEST WALL AND LOWER NECK: No acute fracture or dislocation of the included osseous structures are appreciated.  The thoracic wall soft tissues are within normal limits.   THORACIC SPINE: The vertebral body heights and alignment are maintained.  There is no acute fracture.  There is  multilevel degenerative disc disease.  The prevertebral soft tissues are unremarkable.   ABDOMEN:   LIVER: No focal perfusion abnormality of the liver is appreciated to suggest contusion or laceration. There is no subcapsular hematoma, no perihepatic fluid collection.   GALLBLADDER: Present.   BILE DUCTS: No  biliary ductal dilation.   PANCREAS: No peripancreatic inflammatory changes or fluid collections.   SPLEEN: No parenchymal perfusion deficit of the spleen is appreciated to suggest contusion or laceration. There is no subcapsular hematoma, no perisplenic fluid collection.   ADRENAL GLANDS: The bilateral adrenal glands are unremarkable in appearance.   KIDNEYS AND URETERS: No parenchymal perfusion deficit is appreciated in bilateral kidneys to suggest contusion or laceration. There is no subcapsular hematoma, no perinephric fluid collection.  No hydronephrosis or hydroureter. There are bilateral renal cysts measuring 1.6 cm the right kidney and 1.1 cm at the left kidney.   PELVIS:   BLADDER: Partially distended.   REPRODUCTIVE ORGANS: The uterus is surgically absent.   BOWEL: The stomach is partially distended. No bowel obstruction. The transverse colon and the descending colon are decompressed with diffuse wall thickening. There is colonic diverticulosis with no CT evidence for acute diverticulitis. There is a stool ball distending the rectum. The appendix is normal.   VESSELS: Atherosclerotic calcifications within the abdominal aorta and its branches. No abdominal aortic aneurysm. The principal vasculature of the abdomen and pelvis is patent as visualized. There is a circumaortic left renal vein.   PERITONEUM/RETROPERITONEUM/LYMPH NODES: There is no evidence of retroperitoneal hematoma.  There is no free fluid or free air.  No pathologically enlarged lymph nodes are identified.   BONES AND ABDOMINAL WALL: No evidence of acute fracture or dislocation of the included osseous structures. Moderate degenerative changes at the bilateral hips. There is a small fat containing umbilical hernia.   LUMBAR SPINE: There is 4 mm anterolisthesis of L3 on L4. The vertebral body heights are maintained. There is no acute fracture.  There is multilevel degenerative disc disease. Multilevel facet arthropathy is also present. The  prevertebral soft tissues are unremarkable.       CHEST/THORACIC SPINE : 1.  No acute posttraumatic findings within the thorax. 2. Small nodules at the thyroid gland. Nonemergent thyroid ultrasound can be obtained for further characterization. 3. Small hiatal hernia. 4.  No acute fracture of the thoracic spine. Degenerative changes.   ABDOMEN - PELVIS/LUMBAR SPINE: 1.  No acute posttraumatic findings within the abdomen or pelvis. 2. Diffuse colonic wall thickening at the transverse colon and descending colon, may be secondary to underdistention versus colitis. 3. Colonic diverticulosis. 4. Stool ball distending the rectum. 5.  No acute fracture of the lumbar spine. Degenerative changes.     MACRO: None.   Signed by: Karishma Martin 6/9/2024 6:58 PM Dictation workstation:   [id number]    CT lumbar spine wo IV contrast    Result Date: 6/9/2024  Interpreted By:  Karishma Martin, STUDY: CT CHEST ABDOMEN PELVIS W IV CONTRAST; CT THORACIC SPINE WO IV CONTRAST; CT LUMBAR SPINE WO IV CONTRAST;  6/9/2024 5:53 pm   INDICATION: Signs/Symptoms:Trauma. Fall down stairs, laceration to back of head, loss of consciousness and vomiting; Signs/Symptoms:Fall down stairs, head injury, head laceration, loss of consciousness, vomiting   COMPARISON: Chest x-ray 04/26/2021. CT coronary artery calcium score 03/13/2019.   ACCESSION NUMBER(S): [id number]; [id number]; [id number]   ORDERING CLINICIAN: BRITTA ZHAO   TECHNIQUE: Axial CT of the chest, abdomen, and pelvis was obtained. Coronal and sagittal reconstructions were performed. Intravenous contrast material was administered without immediate complications. Multiplanar reformatted images of the thoracic spine and lumbar spine were obtained from concurrent CT chest/abdomen/pelvis.   FINDINGS: There is motion artifact. There is streak artifact from the patient's arms along the body and superimposed radiopaque densities.   CHEST:   LUNG/PLEURA/LARGE AIRWAYS: The trachea and the  central airways are patent. There is biapical pleural scarring. No consolidation, pleural effusion or pneumothorax.   VESSELS: No traumatic aortic injury is appreciated within the limitations of this non-EKG gated study.  The thoracic aorta is of normal course and caliber.     HEART: The heart is normal in size.   There is no pericardial effusion.   MEDIASTINUM AND MORENITA: Small hypodense nodules at the visualized thyroid gland, the largest measures 8 mm. No pneumomediastinum, abnormal mediastinal fluid collection or mediastinal hematoma are appreciated.  No mediastinal, hilar or biaxillary adenopathy is present.   There is a small hiatal hernia.   CHEST WALL AND LOWER NECK: No acute fracture or dislocation of the included osseous structures are appreciated.  The thoracic wall soft tissues are within normal limits.   THORACIC SPINE: The vertebral body heights and alignment are maintained.  There is no acute fracture.  There is  multilevel degenerative disc disease.  The prevertebral soft tissues are unremarkable.   ABDOMEN:   LIVER: No focal perfusion abnormality of the liver is appreciated to suggest contusion or laceration. There is no subcapsular hematoma, no perihepatic fluid collection.   GALLBLADDER: Present.   BILE DUCTS: No biliary ductal dilation.   PANCREAS: No peripancreatic inflammatory changes or fluid collections.   SPLEEN: No parenchymal perfusion deficit of the spleen is appreciated to suggest contusion or laceration. There is no subcapsular hematoma, no perisplenic fluid collection.   ADRENAL GLANDS: The bilateral adrenal glands are unremarkable in appearance.   KIDNEYS AND URETERS: No parenchymal perfusion deficit is appreciated in bilateral kidneys to suggest contusion or laceration. There is no subcapsular hematoma, no perinephric fluid collection.  No hydronephrosis or hydroureter. There are bilateral renal cysts measuring 1.6 cm the right kidney and 1.1 cm at the left kidney.   PELVIS:   BLADDER:  Partially distended.   REPRODUCTIVE ORGANS: The uterus is surgically absent.   BOWEL: The stomach is partially distended. No bowel obstruction. The transverse colon and the descending colon are decompressed with diffuse wall thickening. There is colonic diverticulosis with no CT evidence for acute diverticulitis. There is a stool ball distending the rectum. The appendix is normal.   VESSELS: Atherosclerotic calcifications within the abdominal aorta and its branches. No abdominal aortic aneurysm. The principal vasculature of the abdomen and pelvis is patent as visualized. There is a circumaortic left renal vein.   PERITONEUM/RETROPERITONEUM/LYMPH NODES: There is no evidence of retroperitoneal hematoma.  There is no free fluid or free air.  No pathologically enlarged lymph nodes are identified.   BONES AND ABDOMINAL WALL: No evidence of acute fracture or dislocation of the included osseous structures. Moderate degenerative changes at the bilateral hips. There is a small fat containing umbilical hernia.   LUMBAR SPINE: There is 4 mm anterolisthesis of L3 on L4. The vertebral body heights are maintained. There is no acute fracture.  There is multilevel degenerative disc disease. Multilevel facet arthropathy is also present. The prevertebral soft tissues are unremarkable.       CHEST/THORACIC SPINE : 1.  No acute posttraumatic findings within the thorax. 2. Small nodules at the thyroid gland. Nonemergent thyroid ultrasound can be obtained for further characterization. 3. Small hiatal hernia. 4.  No acute fracture of the thoracic spine. Degenerative changes.   ABDOMEN - PELVIS/LUMBAR SPINE: 1.  No acute posttraumatic findings within the abdomen or pelvis. 2. Diffuse colonic wall thickening at the transverse colon and descending colon, may be secondary to underdistention versus colitis. 3. Colonic diverticulosis. 4. Stool ball distending the rectum. 5.  No acute fracture of the lumbar spine. Degenerative changes.      MACRO: None.   Signed by: Karishma Martin 6/9/2024 6:58 PM Dictation workstation:   [id number]    CT thoracic spine wo IV contrast    Result Date: 6/9/2024  Interpreted By:  Karishma Martin, STUDY: CT CHEST ABDOMEN PELVIS W IV CONTRAST; CT THORACIC SPINE WO IV CONTRAST; CT LUMBAR SPINE WO IV CONTRAST;  6/9/2024 5:53 pm   INDICATION: Signs/Symptoms:Trauma. Fall down stairs, laceration to back of head, loss of consciousness and vomiting; Signs/Symptoms:Fall down stairs, head injury, head laceration, loss of consciousness, vomiting   COMPARISON: Chest x-ray 04/26/2021. CT coronary artery calcium score 03/13/2019.   ACCESSION NUMBER(S): [id number]; [id number]; [id number]   ORDERING CLINICIAN: BRITTA ZHAO   TECHNIQUE: Axial CT of the chest, abdomen, and pelvis was obtained. Coronal and sagittal reconstructions were performed. Intravenous contrast material was administered without immediate complications. Multiplanar reformatted images of the thoracic spine and lumbar spine were obtained from concurrent CT chest/abdomen/pelvis.   FINDINGS: There is motion artifact. There is streak artifact from the patient's arms along the body and superimposed radiopaque densities.   CHEST:   LUNG/PLEURA/LARGE AIRWAYS: The trachea and the central airways are patent. There is biapical pleural scarring. No consolidation, pleural effusion or pneumothorax.   VESSELS: No traumatic aortic injury is appreciated within the limitations of this non-EKG gated study.  The thoracic aorta is of normal course and caliber.     HEART: The heart is normal in size.   There is no pericardial effusion.   MEDIASTINUM AND MORENITA: Small hypodense nodules at the visualized thyroid gland, the largest measures 8 mm. No pneumomediastinum, abnormal mediastinal fluid collection or mediastinal hematoma are appreciated.  No mediastinal, hilar or biaxillary adenopathy is present.   There is a small hiatal hernia.   CHEST WALL AND LOWER NECK: No acute fracture or  dislocation of the included osseous structures are appreciated.  The thoracic wall soft tissues are within normal limits.   THORACIC SPINE: The vertebral body heights and alignment are maintained.  There is no acute fracture.  There is  multilevel degenerative disc disease.  The prevertebral soft tissues are unremarkable.   ABDOMEN:   LIVER: No focal perfusion abnormality of the liver is appreciated to suggest contusion or laceration. There is no subcapsular hematoma, no perihepatic fluid collection.   GALLBLADDER: Present.   BILE DUCTS: No biliary ductal dilation.   PANCREAS: No peripancreatic inflammatory changes or fluid collections.   SPLEEN: No parenchymal perfusion deficit of the spleen is appreciated to suggest contusion or laceration. There is no subcapsular hematoma, no perisplenic fluid collection.   ADRENAL GLANDS: The bilateral adrenal glands are unremarkable in appearance.   KIDNEYS AND URETERS: No parenchymal perfusion deficit is appreciated in bilateral kidneys to suggest contusion or laceration. There is no subcapsular hematoma, no perinephric fluid collection.  No hydronephrosis or hydroureter. There are bilateral renal cysts measuring 1.6 cm the right kidney and 1.1 cm at the left kidney.   PELVIS:   BLADDER: Partially distended.   REPRODUCTIVE ORGANS: The uterus is surgically absent.   BOWEL: The stomach is partially distended. No bowel obstruction. The transverse colon and the descending colon are decompressed with diffuse wall thickening. There is colonic diverticulosis with no CT evidence for acute diverticulitis. There is a stool ball distending the rectum. The appendix is normal.   VESSELS: Atherosclerotic calcifications within the abdominal aorta and its branches. No abdominal aortic aneurysm. The principal vasculature of the abdomen and pelvis is patent as visualized. There is a circumaortic left renal vein.   PERITONEUM/RETROPERITONEUM/LYMPH NODES: There is no evidence of retroperitoneal  hematoma.  There is no free fluid or free air.  No pathologically enlarged lymph nodes are identified.   BONES AND ABDOMINAL WALL: No evidence of acute fracture or dislocation of the included osseous structures. Moderate degenerative changes at the bilateral hips. There is a small fat containing umbilical hernia.   LUMBAR SPINE: There is 4 mm anterolisthesis of L3 on L4. The vertebral body heights are maintained. There is no acute fracture.  There is multilevel degenerative disc disease. Multilevel facet arthropathy is also present. The prevertebral soft tissues are unremarkable.       CHEST/THORACIC SPINE : 1.  No acute posttraumatic findings within the thorax. 2. Small nodules at the thyroid gland. Nonemergent thyroid ultrasound can be obtained for further characterization. 3. Small hiatal hernia. 4.  No acute fracture of the thoracic spine. Degenerative changes.   ABDOMEN - PELVIS/LUMBAR SPINE: 1.  No acute posttraumatic findings within the abdomen or pelvis. 2. Diffuse colonic wall thickening at the transverse colon and descending colon, may be secondary to underdistention versus colitis. 3. Colonic diverticulosis. 4. Stool ball distending the rectum. 5.  No acute fracture of the lumbar spine. Degenerative changes.     MACRO: None.   Signed by: Karishma Martin 6/9/2024 6:58 PM Dictation workstation:   [id number]    CT head W O contrast trauma protocol    Result Date: 6/9/2024  Interpreted By:  Karishma Martin, STUDY: CT HEAD W/O CONTRAST TRAUMA PROTOCOL; CT CERVICAL SPINE WO IV CONTRAST;  6/9/2024 5:41 pm   INDICATION: Signs/Symptoms:Fall down stairs, laceration to back of head, loss of consciousness and vomiting.   COMPARISON: CT head 04/26/2021   ACCESSION NUMBER(S): [id number]; [id number]   ORDERING CLINICIAN: BRITTA ZHAO   TECHNIQUE: Axial noncontrast images of the head  with coronal  and sagittal reconstructed images . Axial noncontrast images of the cervical spine with coronal and sagittal  reconstructed images.   FINDINGS: There is streak artifact from the patient's dental amalgam.   BRAIN PARENCHYMA:  The gray white matter differentiation is preserved. No acute territorial infarct. Periventricular white matter hypodensities are probably representing chronic microvascular ischemic changes. No mass effect or midline shift. There is trace amount of pneumocephalus at the left posterior fossa.   HEMORRHAGE: There is a small amount of acute subarachnoid hemorrhage at the right frontal and temporal regions. There is trace amount of acute subdural hematoma at the right frontal region and right temporal region measuring 3 mm in thickness. VENTRICLES AND EXTRA-AXIAL SPACES: The ventricles are prominent, suggestive of diffuse parenchymal volume loss. No evidence of abnormal extraaxial fluid collection. PARANASAL SINUSES AND MASTOIDS: No air-fluid levels at the visualized paranasal sinuses. The right mastoid air cells are clear. There is partial fluid opacification of the left mastoid air cells with fluid at the left middle ear. There is also fluid at the left external auditory canal. CALVARIUM: There is an acute nondisplaced fracture of the left occipital bone. There is a linear lucency at the left temporal bone, suggestive of an acute nondisplaced transverse fracture   EXTRACRANIAL SOFT TISSUES: There is a small amount of subcutaneous gas at the soft tissues of the left occipital region.   OTHER FINDINGS: None     Brain Injury (BIG) guidelines CT values:   Skull fracture: No SDH (subdural hematoma): <=4mm EDH (epidural hematoma): None detected IPH (intraparenchymal hemorrhage): None detected SAH (subarachnoid hemorrhage): Scattered IVH (intraventricular hemorrhage): No   Reference: Eren CHERY, Wyatt RS, Saniya M, et al. The BIG (brain injury guidelines) project: defining the management of traumatic brain injury by acute care surgeons. J Trauma Acute Care Surg. 2014;76:662n686.     CERVICAL SPINE: ALIGNMENT:  Within normal limits. VERTEBRAE: No acute fracture. Degenerative changes at C1-C2 with pannus formation. DISCS: There is multilevel degenerative disc disease with osteophytosis. There is multilevel facet arthropathy. PREVERTEBRAL SOFT TISSUES: No prevertebral soft tissue swelling. LUNG APICES: There is biapical pleural scarring.         1. Small amount of acute subarachnoid hemorrhage at the right frontal and right temporal regions. 2. Trace amount of acute subdural hematoma at the right frontal region and right temporal region. 3. Acute nondisplaced fracture at the left occipital bone. Trace amount of pneumocephalus at the left posterior fossa. 4. Acute nondisplaced fracture at the left temporal bone with partial fluid opacification of the left mastoid air cell, fluid at the left middle ear and fluid at the left external auditory canal. Please correlate with clinical exam for hemotympanum. 5. Small amount of subcutaneous emphysema at the scalp at the left occipital region, suggestive of soft tissue laceration. 6.  No acute fracture at the cervical spine. Degenerative changes.     MACRO: Karishma Martin discussed the significance and urgency of this critical finding by telephone with  BRITTA ZHAO on 6/9/2024 at 6:28 pm.  (**-RCF-**) Findings:  See findings.     Signed by: Karishma Martin 6/9/2024 6:30 PM Dictation workstation:   [id number]    CT cervical spine wo IV contrast    Result Date: 6/9/2024  Interpreted By:  Karishma Martin, STUDY: CT HEAD W/O CONTRAST TRAUMA PROTOCOL; CT CERVICAL SPINE WO IV CONTRAST;  6/9/2024 5:41 pm   INDICATION: Signs/Symptoms:Fall down stairs, laceration to back of head, loss of consciousness and vomiting.   COMPARISON: CT head 04/26/2021   ACCESSION NUMBER(S): [id number]; [id number]   ORDERING CLINICIAN: BRITTA ZHAO   TECHNIQUE: Axial noncontrast images of the head  with coronal  and sagittal reconstructed images . Axial noncontrast images of the cervical spine with coronal and  sagittal reconstructed images.   FINDINGS: There is streak artifact from the patient's dental amalgam.   BRAIN PARENCHYMA:  The gray white matter differentiation is preserved. No acute territorial infarct. Periventricular white matter hypodensities are probably representing chronic microvascular ischemic changes. No mass effect or midline shift. There is trace amount of pneumocephalus at the left posterior fossa.   HEMORRHAGE: There is a small amount of acute subarachnoid hemorrhage at the right frontal and temporal regions. There is trace amount of acute subdural hematoma at the right frontal region and right temporal region measuring 3 mm in thickness. VENTRICLES AND EXTRA-AXIAL SPACES: The ventricles are prominent, suggestive of diffuse parenchymal volume loss. No evidence of abnormal extraaxial fluid collection. PARANASAL SINUSES AND MASTOIDS: No air-fluid levels at the visualized paranasal sinuses. The right mastoid air cells are clear. There is partial fluid opacification of the left mastoid air cells with fluid at the left middle ear. There is also fluid at the left external auditory canal. CALVARIUM: There is an acute nondisplaced fracture of the left occipital bone. There is a linear lucency at the left temporal bone, suggestive of an acute nondisplaced transverse fracture   EXTRACRANIAL SOFT TISSUES: There is a small amount of subcutaneous gas at the soft tissues of the left occipital region.   OTHER FINDINGS: None     Brain Injury (BIG) guidelines CT values:   Skull fracture: No SDH (subdural hematoma): <=4mm EDH (epidural hematoma): None detected IPH (intraparenchymal hemorrhage): None detected SAH (subarachnoid hemorrhage): Scattered IVH (intraventricular hemorrhage): No   Reference: Eren CHERY, Wyatt RS, Saniya M, et al. The BIG (brain injury guidelines) project: defining the management of traumatic brain injury by acute care surgeons. J Trauma Acute Care Surg. 2014;76:723p216.     CERVICAL SPINE:  ALIGNMENT: Within normal limits. VERTEBRAE: No acute fracture. Degenerative changes at C1-C2 with pannus formation. DISCS: There is multilevel degenerative disc disease with osteophytosis. There is multilevel facet arthropathy. PREVERTEBRAL SOFT TISSUES: No prevertebral soft tissue swelling. LUNG APICES: There is biapical pleural scarring.         1. Small amount of acute subarachnoid hemorrhage at the right frontal and right temporal regions. 2. Trace amount of acute subdural hematoma at the right frontal region and right temporal region. 3. Acute nondisplaced fracture at the left occipital bone. Trace amount of pneumocephalus at the left posterior fossa. 4. Acute nondisplaced fracture at the left temporal bone with partial fluid opacification of the left mastoid air cell, fluid at the left middle ear and fluid at the left external auditory canal. Please correlate with clinical exam for hemotympanum. 5. Small amount of subcutaneous emphysema at the scalp at the left occipital region, suggestive of soft tissue laceration. 6.  No acute fracture at the cervical spine. Degenerative changes.     MACRO: Karishma Martin discussed the significance and urgency of this critical finding by telephone with  BRITTA ZHAO on 6/9/2024 at 6:28 pm.  (**-RCF-**) Findings:  See findings.     Signed by: Karishma Martin 6/9/2024 6:30 PM Dictation workstation:   [id number]          Assessment/Plan   Tracy BravoHuseyinBrant is a 74 y.o. female on day 0 of admission presenting with Traumatic subarachnoid bleed with LOC of 30 minutes or less, sequela (CMS-HCC).  Principal Problem:    Traumatic subarachnoid bleed with LOC of 30 minutes or less, sequela (CMS-HCC)  Active Problems:    Traumatic subarachnoid hemorrhage with loss of consciousness of 30 minutes or less, initial encounter (Multi)      74-year-old female with past medical history of non-insulin-dependent diabetes mellitus, hyperlipidemia, hypertension, tremors, anxiety, CKD stage III,  TIA presented to emergency department after falling 3-4 steps landing on her back of the head.  Patient had acute small subarachnoid hemorrhage with small acute subdural hematoma with acute nondisplaced fracture of the left occipital bone and nondisplaced fracture of the left temporal bone with a partial fluid opacification of the mastoid air cells.  She was admitted to ICU and being downgraded to hospitalist service.    Traumatic subarachnoid hemorrhage and subdural hematoma  Neurology consult is noted and appreciated  Okay to downgrade patient to stepdown unit with every 2 hours neurochecks  If patient remains stable, downgrade to floor later today and discharge planning  PT OT evaluation  Hold off on any anticoagulation  Follow-up with neurosurgery with repeat scan in 2 weeks as outpatient  Multimodality pain control    Non-insulin-dependent diabetes mellitus  Hold metformin  Continue Januvia  Accu-Chek with sliding scale insulin    Hyperlipidemia  Continue with atorvastatin    Tremors  Has been going on for a while  Follow-up echocardiogram    Hypertension  Continue with losartan    DVT prophylaxis  SCDs       Plan discussed with patient and daughters at bedside    High level of MDM based on above issue and discussing plan    This note is created using voice recognition software. All efforts are made to minimize errors, if there are errors there due to transcription.    Morgan Quinn  Hospitalist

## 2024-06-10 NOTE — CARE PLAN
Problem: Skin  Goal: Decreased wound size/increased tissue granulation at next dressing change  Outcome: Progressing  Flowsheets (Taken 6/10/2024 0336)  Decreased wound size/increased tissue granulation at next dressing change: Promote sleep for wound healing  Goal: Participates in plan/prevention/treatment measures  Outcome: Progressing  Flowsheets (Taken 6/10/2024 0336)  Participates in plan/prevention/treatment measures:   Discuss with provider PT/OT consult   Elevate heels  Goal: Prevent/manage excess moisture  Outcome: Progressing  Flowsheets (Taken 6/10/2024 0336)  Prevent/manage excess moisture:   Moisturize dry skin   Monitor for/manage infection if present  Goal: Prevent/minimize sheer/friction injuries  Outcome: Progressing  Flowsheets (Taken 6/10/2024 0336)  Prevent/minimize sheer/friction injuries:   HOB 30 degrees or less   Turn/reposition every 2 hours/use positioning/transfer devices  Goal: Promote/optimize nutrition  Outcome: Progressing  Flowsheets (Taken 6/10/2024 0336)  Promote/optimize nutrition: Monitor/record intake including meals  Goal: Promote skin healing  Outcome: Progressing  Flowsheets (Taken 6/10/2024 0336)  Promote skin healing: Turn/reposition every 2 hours/use positioning/transfer devices     Problem: Fall/Injury  Goal: Not fall by end of shift  Outcome: Progressing  Goal: Be free from injury by end of the shift  Outcome: Progressing  Goal: Verbalize understanding of personal risk factors for fall in the hospital  Outcome: Progressing  Goal: Verbalize understanding of risk factor reduction measures to prevent injury from fall in the home  Outcome: Progressing  Goal: Use assistive devices by end of the shift  Outcome: Progressing  Goal: Pace activities to prevent fatigue by end of the shift  Outcome: Progressing     Problem: Pain  Goal: My pain/discomfort is manageable  Outcome: Progressing     Problem: Safety  Goal: Patient will be injury free during hospitalization  Outcome:  Progressing  Goal: I will remain free of falls  Outcome: Progressing     Problem: Daily Care  Goal: Daily care needs are met  Outcome: Progressing     Problem: Psychosocial Needs  Goal: Demonstrates ability to cope with hospitalization/illness  Outcome: Progressing  Goal: Collaborate with me, my family, and caregiver to identify my specific goals  Outcome: Progressing  Flowsheets (Taken 6/9/2024 2744)  Cultural Requests During Hospitalization: no  Spiritual Requests During Hospitalization: no     Problem: Discharge Barriers  Goal: My discharge needs are met  Outcome: Progressing     Problem: Acute Head Injury  Goal: Ansence or control of seizures  Outcome: Progressing  Goal: Absence or control of storming symptoms  Outcome: Progressing  Goal: ICP/CPP within goal  Outcome: Progressing  Goal: Neuro status stable or improved  Outcome: Progressing  Goal: No signs of respiratory compromise  Outcome: Progressing  Goal: Stabilization of hemodynamic status  Outcome: Progressing  Goal: Tolerates invasive procedures w/o compromise  Outcome: Progressing  Goal: Tolerates osmotherapy  Outcome: Progressing   The patient's goals for the shift include      The clinical goals for the shift include to sleep, remain h/d stable

## 2024-06-10 NOTE — PROGRESS NOTES
Physical Therapy    Physical Therapy Evaluation    Patient Name: Tracy Leung  MRN: [id number]  Today's Date: 6/10/2024   Time Calculation  Start Time: 1400  Stop Time: 1427  Time Calculation (min): 27 min  2133/2133-A    Assessment/Plan   PT Assessment  PT Assessment Results: Decreased strength, Decreased endurance, Impaired balance, Decreased mobility  Rehab Prognosis: Good  End of Session Communication: Bedside nurse  Assessment Comment: Pt is a 75 yo female admitted for a SAH, SDH, and fracture of her temporal and occipital bone secondary to a fall. Pt presented with above problems that will be addressed with skilled therapy. Pt will engage in gait and stair training, endurance activities, and bed mobility in order to regain prior level of function. PT recommendation for high intensity skilled therapy in order to work on above. PT will continue to reassess and monitor throughout future sessions.   End of Session Patient Position: Bed, 3 rail up, Alarm on  IP OR SWING BED PT PLAN  Inpatient or Swing Bed: Inpatient  PT Plan  Treatment/Interventions: Bed mobility, Transfer training, Gait training, Stair training, Balance training, Endurance training, Therapeutic activity, Home exercise program  PT Plan: Skilled PT  PT Frequency: Daily  PT Discharge Recommendations: High intensity level of continued care  Equipment Recommended upon Discharge: Wheeled walker  PT Recommended Transfer Status: Assist x2  PT - OK to Discharge: Yes    Subjective     Current Problem:  1. Traumatic subarachnoid hemorrhage with loss of consciousness of 30 minutes or less, initial encounter (Multi)  Critical Care    Critical Care      2. Traumatic subdural hemorrhage with loss of consciousness of 30 minutes or less, initial encounter (Multi)  Critical Care    Critical Care      3. Laceration of scalp, initial encounter  Critical Care    Critical Care    Laceration Repair    Laceration Repair    Laceration Repair    Laceration Repair       4. Chin laceration, initial encounter  Critical Care    Critical Care    Laceration Repair    Laceration Repair    Laceration Repair    Laceration Repair      5. Fracture of occipital bone of skull with loss of consciousness (Multi)  Critical Care    Critical Care      6. Closed fracture of temporal bone, initial encounter (Multi)  Critical Care    Critical Care      7. Traumatic subarachnoid bleed with LOC of 30 minutes or less, sequela ([facility] [place])  Critical Care    Critical Care      8. Syncope, unspecified syncope type  Transthoracic Echo (TTE) Limited    Transthoracic Echo (TTE) Limited        Patient Active Problem List   Diagnosis    Traumatic subarachnoid bleed with LOC of 30 minutes or less, sequela (CMS-HCC)    Traumatic subarachnoid hemorrhage with loss of consciousness of 30 minutes or less, initial encounter (Multi)       General Visit Information:  General  Reason for Referral: impaired mobility  Referred By: Dr Quinn  Past Medical History Relevant to Rehab: SAH, SDH, fracture of temporal and occipital lobe secondary to a fall (Pt noted that she recently experienced a brief episode of partial vision loss a few days prior to admission)  Family/Caregiver Present: Yes  Co-Treatment: OT  Co-Treatment Reason: patient safety  Prior to Session Communication: Bedside nurse  Patient Position Received: Bed, 3 rail up, Alarm on  Preferred Learning Style: kinesthetic, verbal  General Comment: Pt was agreeable to therapy    Home Living:  Home Living  Type of Home: House  Lives With: Spouse  Home Adaptive Equipment: None  Home Layout: One level  Home Access: Stairs to enter with rails  Entrance Stairs-Rails: Left  Entrance Stairs-Number of Steps: 2  Bathroom Shower/Tub: Walk-in shower  Bathroom Equipment: Shower chair with back    Prior Level of Function:  Prior Function Per Pt/Caregiver Report  Level of [place]: Independent with ADLs and functional transfers, Independent with homemaking with ambulation  Receives  Help From:  (Pt splits ADLs with )  ADL Assistance: Independent  Homemaking Assistance: Independent  Ambulatory Assistance: Independent  Prior Function Comments:  (Pt was independent with all ADLs prior to admission. Pt stated that she would occassionally lose her balance but quickly would regain prior to mobility.)    Precautions:  Precautions  Hearing/Visual Limitations:  (Pt has decreased hearing on her L side)  Medical Precautions: Fall precautions    Vital Signs:  Vital Signs  Heart Rate: 82 (92 at rest in bed at end of session)  Heart Rate Source: Monitor  Resp: 19  SpO2: 95 % (92 at end of session)  BP: 152/75 (148/86 seated EOB, 164/90 seated EOB after 2 min, 168/81 seated EOB after 5 min, 165/94 at rest in bed at end of session)  BP Location: Right arm (Pt initially had cuff on L arm, switched to R arm because she stated it was uncomfortable)  Patient Position: Lying (Lying and sitting EOB)  Objective     Pain:  Pain Assessment  Pain Assessment: 0-10  Pain Score: 0 - No pain (Pt stated pain was 0/10 at the moment but could get up to 10/10 not during any particular movements)  Pain Type: Acute pain  Pain Location: Head (Posterior lateral head. Hypersensitivity to touch on lateral R aspect of neck.)  Pain Orientation: Posterior, Right  Pain Frequency: Intermittent  Pain Interventions: Repositioned  Multiple Pain Sites: Two    Cognition:  Cognition  Overall Cognitive Status: Within Functional Limits    General Assessments:      Activity Tolerance  Endurance: Decreased tolerance for upright activites (Pt experienced dizziness during bed mobility and continued during sitting EOB)  Activity Tolerance Comments: Pt intolerant to peripheral visual field. Pt instructed to maintain a forward visual field in order to decrease dizziness    Postural Control  Postural Control: Impaired (Pt held upright posture by holding onto bed rail)  Static Sitting Balance  Static Sitting-Comment/Number of Minutes: fair+ (Pt  held seated balance while holding onto bed rail. Once instructed to use UE support on bed, she began to shift posteriorly)  Static Standing Balance  Static Standing-Comment/Number of Minutes: poor (Pt was supported posteriorly by bed and had a posterior lean)  Dynamic Standing Balance  Dynamic Standing-Comments: poor    Functional Assessments:     Bed Mobility  Bed Mobility: Yes  Bed Mobility 1  Bed Mobility 1: Supine to sitting  Level of Assistance 1: Moderate assistance, +2 (Pt was able to scoot toward EOB, she utilized the bed rail to pull her L side up but then needed hand-held assistance to pull her R side up into sitting)  Bed Mobility 2  Bed Mobility  2: Sitting to supine  Level of Assistance 2: Moderate assistance  Bed Mobility Comments 2: Pt able to scoot herself back into bed with a controlled decent into supine position with min A  Transfers  Transfer: Yes  Transfer 1  Transfer From 1: Sit to  Transfer to 1: Stand  Technique 1: Sit to stand  Transfer Device 1: Walker  Transfer Level of Assistance 1: Minimal verbal cues, Minimum assistance (Min A x 2. Pt educated on forward lean, pushing off edge of bed, and then grabbing for walker)  Transfers 2  Transfer From 2: Stand to  Transfer to 2: Sit  Technique 2: Stand to sit  Transfer Device 2: Walker  Transfer Level of Assistance 2: Minimum assistance, Minimal verbal cues  Ambulation/Gait Training 1  Surface 1: Level tile  Device 1: Rolling walker  Assistance 1: Minimum assistance (Min A x 2)  Comments/Distance (ft) 1: Pt completed 5 side-steps toward L side in order to move toward head of bed using WW and min A x 2    Extremity/Trunk Assessments:    Outcome Measures:     [facility] Basic Mobility  Turning from your back to your side while in a flat bed without using bedrails: A lot  Moving from lying on your back to sitting on the side of a flat bed without using bedrails: A lot  Moving to and from bed to chair (including a wheelchair): A lot  Standing up from a  chair using your arms (e.g. wheelchair or bedside chair): A lot  To walk in hospital room: A lot  Climbing 3-5 steps with railing: A lot  Basic Mobility - Total Score: 12    Goals:  Encounter Problems       Encounter Problems (Active)       PT Problem       Pt will perform independent bed mobility without use of handrails        Start:  06/10/24    Expected End:  06/24/24            Pt will perform sit to stand with CGA with WW        Start:  06/10/24    Expected End:  06/24/24            Pt will ambulate 150 ft with CGA with WW       Start:  06/10/24    Expected End:  06/24/24            Pt will ambulate up 2 stairs with handrail with CGA        Start:  06/10/24    Expected End:  06/24/24                 Education Documentation  No documentation found.  Education Comments  No comments found.    I was present for the examination and documentation of this student physical therapist. Kristie Simmons PT, DPT, CCS

## 2024-06-10 NOTE — H&P
Critical Care Medicine History and Physical        Subjective   Patient is a 74 y.o. female admitted on 6/9/2024  5:13 PM with chief complaint of fall. Found to have traumatic SAH, SDH, occipital bone fracture, closed temporal bone fracture.     HPI:  74F with pmh NIDDM, HLD, HTN, intention tremor, anxiety, CKD3am, TIA  presented to the ED today after falling down 3-4 stairs and landing on the back of her head. She is not on anticoagulation. She does not recall the event itself however she was reportedly unconscious for 30-40 seconds. She is currently experiencing pain on the right anterior neck. Denies headache, vision or hearing changes, numbness, tingling, nausea/vomiting, chest pain, shortness of breath, recent changes to urine or stool. No focal neurodeficit discovered, posterior scalp laceration and chin lacerations discovered and sutured, physical exam also notable for L hemotympanum. boostrix updated.      ED course  Presented by EMS to ED, reportedly fell backwards while going up bleachers at a high school game. She was initially oriented to self only, normally a/ox4.     CT head: small right frontal and temporal acute SAH, trace R frontotemporal acute SDH, acute nondisplaced fracutre of L occipital bone and L temporal bone. partial  fluid opacification of the left mastoid air cell, fluid at the left middle ear and fluid at the left external auditory canal.   CT C/T/L spine: no traumatic findings  CT C/A/P: no traumatic findings. Incidental thyroid nodules, hiatal hernia, diverticulosis, stool ball    Lactate initially elevated, improved.  No significant electrolyte or hepatorenal abnormality  No coagulopathy      No past medical history on file.  Past Surgical History:   Procedure Laterality Date    MR HEAD ANGIO WO IV CONTRAST  4/26/2021    MR HEAD ANGIO WO IV CONTRAST 4/26/2021 [facility] EMERGENCY LEGACY    MR NECK ANGIO WO IV CONTRAST  4/26/2021    MR NECK ANGIO WO IV CONTRAST 4/26/2021 [facility] EMERGENCY LEGACY      Medications Prior to Admission   Medication Sig Dispense Refill Last Dose    allopurinol (Zyloprim) 300 mg tablet Take 1 tablet (300 mg) by mouth once daily.   6/9/2024 at AM    aspirin 81 mg EC tablet Take 1 tablet (81 mg) by mouth once daily.   6/9/2024 at AM    atorvastatin (Lipitor) 40 mg tablet Take 1 tablet (40 mg) by mouth once daily at bedtime.   6/8/2024 at PM    biotin 1 mg capsule Take 1 capsule (1 mg) by mouth once daily.   6/9/2024 at AM    celecoxib (CeleBREX) 200 mg capsule Take 1 capsule (200 mg) by mouth once daily.   6/9/2024 at AM    citalopram (CeleXA) 10 mg tablet Take 1 tablet (10 mg) by mouth once daily.   6/9/2024 at AM    cyanocobalamin (Vitamin B-12) 1,000 mcg tablet Take 1 tablet (1,000 mcg) by mouth once daily.   6/9/2024 at AM    fluorouracil (Efudex) 5 % cream Apply 1 Application topically 2 times a day. To upper lip and chest   6/9/2024 at AM    losartan (Cozaar) 100 mg tablet Take 1 tablet (100 mg) by mouth once daily.   6/9/2024 at AM    metFORMIN (Glucophage) 500 mg tablet Take 1 tablet (500 mg) by mouth 2 times daily (morning and late afternoon).   6/9/2024 at AM    RABEprazole (Aciphex) EC tablet Take 1 tablet (20 mg) by mouth once daily in the morning. Take before meals.   6/9/2024 at AM    semaglutide 2 mg/dose (8 mg/3 mL) pen injector Inject 2 mg under the skin 1 (one) time per week.   Past Week    SITagliptin phosphate (Januvia) 50 mg tablet Take 1 tablet (50 mg) by mouth once daily.   6/9/2024 at AM    thiamine 100 mg tablet Take 1 tablet (100 mg) by mouth once daily.   6/9/2024 at AM    zonisamide (Zonegran) 100 mg capsule Take 1 capsule (100 mg) by mouth once daily.   6/9/2024 at AM    fluticasone (Flonase) 50 mcg/actuation nasal spray Administer 1 spray into each nostril once daily as needed for rhinitis.        Bactrim [sulfamethoxazole-trimethoprim], Cefdinir, and Wellbutrin [bupropion hcl]     No family history on file.    Scheduled Medications:   insulin lispro,  0-5 Units, subcutaneous, q4h         Continuous Medications:   lactated Ringer's, 100 mL/hr, Last Rate: 100 mL/hr (06/09/24 1824)  niCARdipine, 2.5-15 mg/hr, Last Rate: Stopped (06/09/24 2100)         PRN Medications:   PRN medications: dextrose, dextrose, glucagon, glucagon    Objective   Vitals:  24hr Min/Max:  Temp  Min: 36.2 °C (97.2 °F)  Max: 36.8 °C (98.2 °F)  Pulse  Min: 76  Max: 106  BP  Min: 112/63  Max: 204/89  Resp  Min: 9  Max: 18  SpO2  Min: 94 %  Max: 99 %    Physical exam:    Physical Exam  Vitals and nursing note reviewed.   Constitutional:       General: She is not in acute distress.     Appearance: Normal appearance. She is not ill-appearing.   HENT:      Head: Normocephalic and atraumatic. No raccoon eyes, Gonzalez's sign, contusion or laceration.      Jaw: No trismus or malocclusion.      Right Ear: External ear normal.      Left Ear: External ear normal.      Mouth/Throat:      Mouth: Mucous membranes are moist.      Pharynx: Oropharynx is clear.   Eyes:      Extraocular Movements: Extraocular movements intact.      Pupils: Pupils are equal, round, and reactive to light.   Neck:      Trachea: No tracheal deviation.   Cardiovascular:      Rate and Rhythm: Normal rate and regular rhythm.      Pulses: Normal pulses.   Pulmonary:      Effort: No respiratory distress.      Breath sounds: No wheezing, rhonchi or rales.   Chest:      Chest wall: No tenderness.   Abdominal:      General: Abdomen is flat.      Palpations: Abdomen is soft. There is no mass.      Tenderness: There is no abdominal tenderness.   Musculoskeletal:         General: No signs of injury.      Cervical back: Normal range of motion.      Right lower leg: No edema.      Left lower leg: No edema.   Skin:     Coloration: Skin is not jaundiced or pale.      Findings: No petechiae, rash or wound.   Neurological:      General: No focal deficit present.      Mental Status: She is alert and oriented to person, place, and time.      Cranial  Nerves: No cranial nerve deficit.      Sensory: No sensory deficit.      Motor: No weakness.      Coordination: Coordination normal.      Gait: Gait normal.      Deep Tendon Reflexes: Reflexes normal.   Psychiatric:         Speech: Speech normal.         Thought Content: Thought content does not include homicidal or suicidal ideation.         Assessment /Plan      Neuro: traumatic  R frontoparietal SAH and SDH, closed occipital and L temporal fractures  -History: anxiety  -Home meds: citalopram 10qd, zonisamide 100qd  -GCS: 15, A/Ox4  -Pain: ASA (H), celebrex (H)  -Sedation: none  -CAM ICU    Cardiac: no acute issues  - History: HTN, HLD  - Goals: SBP<160  - Home meds: losartan 100qd, atorvastatin 40qd  - Last echo: LVEF 55-60 2021    Pulmonary: No acute issues  -History: none      Continuous pulse oximetry   O2 PRN to maintain SpO2 > 94%, wean as tolerated    Gastrointestinal: No acute issues  -History: gerd  -Home meds: protonix  - Diet: RD on consult, appreciate recs after passes bedside swallow  - Supplementation: B1, B12, B7 (held)  - Bowel regimen: miralax  - Last BM:      Renal: no acute issues  - Daily RFP,Mg,Phos  Net IO Since Admission: No IO data has been entered for this period [06/09/24 2219]  Results from last 72 hours   Lab Units 06/09/24  1750   BUN mg/dL 24*   CREATININE mg/dL 1.08*   - Electrolytes: Replete per protocol, goal K>4 Phos >3 Mg >2    Endocrine: No acute issues  -History: NIDDM  -Home meds: metformin, sitagliptin, semaglutide (H)  -sliding scale: 1  Results from last 7 days   Lab Units 06/09/24  2116 06/09/24  1750   POCT GLUCOSE mg/dL 213*  --    GLUCOSE mg/dL  --  174*      -BG < 180 goal    Hematology: no acute issues  - Daily CBC  -History: none  Results from last 7 days   Lab Units 06/09/24  1750   HEMOGLOBIN g/dL 9.9*   HEMATOCRIT % 32.1*   PLATELETS AUTO x10*3/uL 215   - DVT Prophylaxis: SCD only    Infectious Disease: No acute issues  -History: none  Results from last 7 days    Lab Units 24  1750   WBC AUTO x10*3/uL 12.4*     Temp (24hrs), Av.6 °C (97.8 °F), Min:36.2 °C (97.2 °F), Max:36.8 °C (98.2 °F)      Musculoskeletal: fall  - PT to see   - OT to see     Integumentary: Scalp lac  - sutures to be removed in approx. 10 days ()    LDA:          CODE STATUS: Full Code    ABCDEF Checklist  Analgesia: Spontaneous awakening trial to be pursued if clinically appropriate. RASS goal reviewed  Breathing: Spontaneous breathing trial to be pursued if clinically appropriate. Mechanical power of assisted ventilation reviewed  Choice of analgesia/sedation: Analgesic and sedative agents adjusted per clinical context.   Delirium assessed by CAM, will avoid exacerbating factors  Early mobility and exercise: Physical and occupational therapy engaged  Family: Plan of care, overall trajectory of patient shared with family. Questions elicited and answered as appropriate.       Due to the high probability of life threatening clinical decompensation, the patient required critical care time evaluating and managing this patient.  Critical care time included obtaining a history, examining the patient, ordering and reviewing studies, discussing, developing, and implementing a management plan, evaluating the patient's response to treatment, and discussion with other care team providers. I saw and evaluated the patient myself.  Critical care time was performed exclusive of billable procedures.    RESTRAINTS: Type - none    Critical care time: 2 hours      Case discussed with attending , Dr. Samy Bowers, DO  Emergency Medicine, PGY 2

## 2024-06-10 NOTE — PROGRESS NOTES
"Nutrition Initial Assessment:   Nutrition Assessment    Reason for Assessment: Provider consult order    Nutrition Note:  Tracy Leung is a 74 y.o. female presenting 6/9 s/p fall with resulting   small right frontal and right temporal subarachnoid hemorrhage and right frontal subdural hematoma. Also with nondisplaced fracture of left occipital bone; Neurosurgery on consult with no current surgical intervention planned. Pt initially required IV nicardipine due to hypertension which has since been resolved; off nicardipine os of 6/9/2024.     Past Medical History  NIDDM, HLD, HTN, intention tremor, anxiety, CKD3am, TIA   Surgical History   has a past surgical history that includes MR angio head wo IV contrast (4/26/2021) and MR angio neck wo IV contrast (4/26/2021).       Nutrition History:  Energy Intake: Good > 75 %  Food and Nutrient History: 6/10: Pt from home with family. Pt denies food allergies or intolerances to food; states good appetite PTA  Vitamin/Herbal Supplement Use: home meds include januvia, glucophage, B12, biotin  Food Allergies/Intolerances:  None  GI Symptoms: None BM 6/9  Oral Problems: None       Anthropometrics:  Height: 161.3 cm (5' 3.5\")   Weight: 72.1 kg (158 lb 15.2 oz)   BMI (Calculated): 27.71  Admit 72.5 kg   IBW 53kg   Pain Score: 3           Weight History:   5/2024: 72.6kg  1/2024: 70.3kg  4/2021: 71.1kg  Weight Change %: no       Nutrition Focused Physical Exam Findings:  No visual losses appreciated, 6/10/2024.  Subcutaneous Fat Loss:   Orbital Fat Pads: Well nourished (slightly bulging fat pads)  Buccal Fat Pads: Well nourished (full, rounded cheeks)  Muscle Wasting:  Temporalis: Well nourished (well-defined muscle)  Pectoralis (Clavicular Region): Well nourished (clavicle not visible)  Edema:  Edema: +1 trace  Edema Location: generalized  Physical Findings:  Skin: Positive (bruising, lacerations; pt pale)    Nutrition Significant Labs:  BMP Trend:   Results from last 7 " "days   Lab Units 06/10/24  0658 06/09/24  1750   GLUCOSE mg/dL 149* 174*   CALCIUM mg/dL 9.3 8.5*   SODIUM mmol/L 137 136   POTASSIUM mmol/L 4.2 4.4   CO2 mmol/L 24 23   CHLORIDE mmol/L 103 105   BUN mg/dL 21 24*   CREATININE mg/dL 0.96 1.08*    , A1C:  Lab Results   Component Value Date    HGBA1C 7.7 (H) 06/09/2024   , BG POCT trend:   Results from last 7 days   Lab Units 06/10/24  1232 06/10/24  0818 06/10/24  0342 06/09/24  2252 06/09/24  2116   POCT GLUCOSE mg/dL 199* 143* 169* 161* 213*    , Renal Lab Trend:   Results from last 7 days   Lab Units 06/10/24  0658 06/09/24  1750   POTASSIUM mmol/L 4.2 4.4   PHOSPHORUS mg/dL 4.3  --    SODIUM mmol/L 137 136   MAGNESIUM mg/dL 1.46*  --    EGFR mL/min/1.73m*2 63 54*   BUN mg/dL 21 24*   CREATININE mg/dL 0.96 1.08*    , TPN/PPN Labs:   Results from last 7 days   Lab Units 06/10/24  0658 06/09/24  1750   GLUCOSE mg/dL 149* 174*   POTASSIUM mmol/L 4.2 4.4   PHOSPHORUS mg/dL 4.3  --    MAGNESIUM mg/dL 1.46*  --    SODIUM mmol/L 137 136   CHLORIDE mmol/L 103 105   ALT U/L 11 12   AST U/L 12 16   ALK PHOS U/L 55 51   BILIRUBIN TOTAL mg/dL 0.5 0.3    , Vit D: No results found for: \"VITD25\" , Vit B12:   Lab Results   Component Value Date    [id number] 207 (L) 04/26/2021    , Folate: No results found for: \"FOLATE\" , CRP: No results found for: \"CRP\"    Nutrition Specific Medications:  Scheduled medications  atorvastatin, 40 mg, oral, Nightly  citalopram, 10 mg, oral, Daily  cyanocobalamin, 1,000 mcg, oral, Daily  fluorouracil, 1 Application, Topical, BID  insulin lispro, 0-5 Units, subcutaneous, q4h  lidocaine, 1 patch, transdermal, Daily  losartan, 100 mg, oral, Daily  pantoprazole, 40 mg, oral, Daily before breakfast  perflutren lipid microspheres, 0.5-10 mL of dilution, intravenous, Once in imaging  perflutren protein A microsphere, 0.5 mL, intravenous, Once in imaging  polyethylene glycol, 17 g, oral, Daily  SITagliptin phosphate, 50 mg, oral, Daily  sulfur hexafluoride " microsphr, 2 mL, intravenous, Once in imaging  thiamine, 100 mg, oral, Daily  zonisamide, 100 mg, oral, Daily      Continuous medications     PRN medications  PRN medications: acetaminophen, dextrose, dextrose, fluticasone, glucagon, glucagon, ondansetron **OR** ondansetron     I/O:   Last BM Date: 06/09/24; Stool Appearance: Soft (06/09/24 2100)    Dietary Orders (From admission, onward)       Start     Ordered    06/10/24 1033  Adult diet Cardiac; 70 gm fat; 2 - 3 grams Sodium  Diet effective now        Question Answer Comment   Diet type Cardiac    Fat restriction: 70 gm fat    Sodium restriction: 2 - 3 grams Sodium        06/10/24 1032                     Estimated Needs:   Total Energy Estimated Needs (kCal):  (1800-2100kcal (25-29kcal/kg of 72.1kg)     Total Protein Estimated Needs (g):  (69-85g (1.3-1.6g/kg of IBW 53kg))     Total Fluid Estimated Needs (mL):  (1mL/kcal/d or a per physician)           Nutrition Diagnosis   Malnutrition Diagnosis  Patient has Malnutrition Diagnosis: No    Nutrition Diagnosis  Patient has Nutrition Diagnosis: Yes  Diagnosis Status (1): New  Nutrition Diagnosis 1: Increased nutrient needs  Related to (1): acute nutritional stress  As Evidenced by (1): s/p fall with CHI and nondisplaced fracture of left occipital  bone.  Additional Assessment Information (1): 6/10: Case discussed with nurse; current diet as cardiac 2-3g Na.       Nutrition Interventions/Recommendations         Nutrition Prescription:  Individualized Nutrition Prescription Provided for : healthful diet.        Nutrition Interventions:   Interventions: Meals and snacks  Meals and Snacks: General healthful diet  Goal: Continue cardiac 2-3g Na diet; if BGs consistently >150mg/dL, consider consistent 60g CHO restriction.  Goal: consume >75% of meals and PRN snacks    Collaboration and Referral of Nutrition Care: Collaboration by nutrition professional with other providers  Goal: OSMEL Sainz    Nutrition Education:   6/10:  met with pt and family at bedside; daughter ordering pt meals. AYR reviewed--aware can order PRN snacks. Pt denies need for further MNT intervention.        Nutrition Monitoring and Evaluation   Food/Nutrient Related History Monitoring  Monitoring and Evaluation Plan: Amount of food, Mealtime behavior  Amount of Food: Estimated amout of food  Criteria: >75% of estimated nutrient needs  Mealtime Behavior: Patient/client/caregiver fatigue during feeding process resulting in inadequate intake  Criteria: avoidance of refusal or skipping of meals.    Body Composition/Growth/Weight History  Monitoring and Evaluation Plan: Weight  Weight: Measured weight  Criteria: maintain current weight    Biochemical Data, Medical Tests and Procedures  Monitoring and Evaluation Plan: Glucose/endocrine profile, Electrolyte/renal panel  Electrolyte and Renal Panel: BUN, Creatinine, Magnesium, Phosphorus, Potassium, Sodium  Criteria: labs WNR  Glucose/Endocrine Profile: Glucose, casual, Glucose, fasting  Criteria: BG 70-180mg/dL    Nutrition Focused Physical Findings  Monitoring and Evaluation Plan: Skin  Skin: Impaired wound healing  Criteria: promote skin integrity       Time Spent/Follow-up Reminder:   Time Spent (min): 60 minutes  Last Date of Nutrition Visit: 06/10/24  Nutrition Follow-Up Needed?: 5-7 days  Follow up Comment: hortencia cobb

## 2024-06-10 NOTE — PROGRESS NOTES
Occupational Therapy    Occupational Therapy    Evaluation    Patient Name: Tracy Leung  MRN: [id number]  Today's Date: 6/10/2024  Time Calculation  Start Time: 1359  Stop Time: 1427  Time Calculation (min): 28 min  2133/2133-A    Assessment  IP OT Assessment  OT Assessment:  (Pt. presents with dizzines, decreased endurance and balance impacting pt. ability to complete ADLs and mobility independently)  Evaluation/Treatment Tolerance:  (Pt. limited by dizziness)  End of Session Communication: Bedside nurse  End of Session Patient Position: Bed, 3 rail up, Alarm on    Plan:  Treatment Interventions: ADL retraining, Functional transfer training  OT Frequency: Daily  OT Discharge Recommendations: High intensity level of continued care  OT Recommended Transfer Status: Assist of 2, Minimal assist (for safety)  OT - OK to Discharge: Yes (Next level of care when cleared by medical team)    Subjective   Per EMR: Patient is a 74 y.o. female admitted on 6/9/2024  5:13 PM with chief complaint of fall. Found to have traumatic SAH, SDH, occipital bone fracture, closed temporal bone fracture.      HPI:  74F with pmh NIDDM, HLD, HTN, intention tremor, anxiety, CKD3am, TIA  presented to the ED today after falling down 3-4 stairs and landing on the back of her head. She is not on anticoagulation. She does not recall the event itself however she was reportedly unconscious for 30-40 seconds. She is currently experiencing pain on the right anterior neck. Denies headache, vision or hearing changes, numbness, tingling, nausea/vomiting, chest pain, shortness of breath, recent changes to urine or stool. No focal neurodeficit discovered, posterior scalp laceration and chin lacerations discovered and sutured, physical exam also notable for L hemotympanum. boostrix updated.        ED course  Presented by EMS to ED, reportedly fell backwards while going up bleachers at a high school game. She was initially oriented to self only, normally  a/ox4.      CT head: small right frontal and temporal acute SAH, trace R frontotemporal acute SDH, acute nondisplaced fracutre of L occipital bone and L temporal bone. partial  fluid opacification of the left mastoid air cell, fluid at the left middle ear and fluid at the left external auditory canal.   Current Problem:  1. Traumatic subarachnoid hemorrhage with loss of consciousness of 30 minutes or less, initial encounter (Multi)  Critical Care    Critical Care      2. Traumatic subdural hemorrhage with loss of consciousness of 30 minutes or less, initial encounter (Multi)  Critical Care    Critical Care      3. Laceration of scalp, initial encounter  Critical Care    Critical Care    Laceration Repair    Laceration Repair    Laceration Repair    Laceration Repair      4. Chin laceration, initial encounter  Critical Care    Critical Care    Laceration Repair    Laceration Repair    Laceration Repair    Laceration Repair      5. Fracture of occipital bone of skull with loss of consciousness (Multi)  Critical Care    Critical Care      6. Closed fracture of temporal bone, initial encounter ([facility])  Critical Care    Critical Care      7. Traumatic subarachnoid bleed with LOC of 30 minutes or less, sequela ([facility])  Critical Care    Critical Care      8. Syncope, unspecified syncope type  Transthoracic Echo (TTE) Limited    Transthoracic Echo (TTE) Limited          General:  General  Reason for Referral: ADLs, discharge planning  Referred By: Dr. Quinn  Family/Caregiver Present:  (family present)  Co-Treatment: PT  Co-Treatment Reason: Safety  Prior to Session Communication: Bedside nurse  Patient Position Received: Bed, 3 rail up, Alarm on    Precautions:  Medical Precautions: Fall precautions  Precautions Comment:  (Keep BP below 170/90)    Vital Signs:  BP:  (148/86 supine; 165/94 sitting)    Pain:  Pain Assessment  Pain Assessment: 0-10  Pain Score:  (does not rate; comes and goes)  Pain Location:  Head    Objective     Cognition:  Overall Cognitive Status: Within Functional Limits  Orientation Level: Oriented X4  Insight: Mild          Home Living:  Home Living Comments:  (Pt. lives with spouse in home with 2 entry steps with bedroom and bathroom on first floor with walk in shower, has shower chair. PLOF independent.)     Prior Function:  Level of [place]: Independent with ADLs and functional transfers  ADL Assistance: Independent  Homemaking Assistance: Independent  Ambulatory Assistance: Independent  Prior Function Comments:  (Per family balance has been an issue and has recent bouts of confusion. Pt. also reports of an instance of blind spot in vision last week)      ADL:  Grooming Assistance: Stand by  LE Dressing Assistance: Moderate    Activity Tolerance:  Endurance: Decreased tolerance for upright activites    Bed Mobility/Transfers:   Bed Mobility  Bed Mobility:  (supine <> sit min assist)  Transfers  Transfer:  (sit<>stand min assist x 2 for safety)         Sitting Balance:  Static Sitting Balance  Static Sitting-Balance Support: Bilateral upper extremity supported  Static Sitting-Level of Assistance: Minimum assistance, Contact guard (dizziness with sitting EOB)    Standing Balance:  Static Standing Balance  Static Standing-Balance Support: Bilateral upper extremity supported  Static Standing-Level of Assistance: Minimum assistance      Sensation:  Sensation Comment: Denies numbness    Strength:       Perception:       Coordination:        Hand Function:  Hand Function  Gross Grasp: Functional  Coordination: Functional    Extremities: RUE   RUE : Within Functional Limits and LUE   LUE: Within Functional Limits    Outcome Measures: [facility] Daily Activity  Putting on and taking off regular lower body clothing: A lot  Bathing (including washing, rinsing, drying): A lot  Putting on and taking off regular upper body clothing: A little  Toileting, which includes using toilet, bedpan or urinal: A  little  Taking care of personal grooming such as brushing teeth: A little  Eating Meals: None  Daily Activity - Total Score: 17             EDUCATION:  Education  Individual(s) Educated: Patient  Education Provided: Fall precautons, Risk and benefits of OT discussed with patient or other, POC discussed and agreed upon  Patient Response to Education: Patient/Caregiver Verbalized Understanding of Information      Goals:   Encounter Problems       Encounter Problems (Active)       Dressings Lower Extremities       STG - Patient to complete lower body dressing SUP       Start:  06/10/24    Expected End:  06/24/24               Functional Balance       LTG - Patient will maintain standing and sitting balance to allow for completion of daily activities       Start:  06/10/24    Expected End:  06/24/24               Grooming       STG - Patient completes grooming SUP       Start:  06/10/24    Expected End:  06/24/24               OT Transfers       STG - Patient will perform toilet transfer SUP       Start:  06/10/24    Expected End:  06/24/24

## 2024-06-10 NOTE — CONSULTS
Reason For Consult  Traumatic subarachnoid hemorrhage    History Of Present Illness  Tracy Leung is a 74 y.o. female presenting following a fall with head impact to [facility] emergency department.  She states that she was at a dance recital for her granddaughter.  While walking out she apparently fell impacting her chin and back of her head for which she has no recollection of this incident.  CT scan on arrival identified small right frontal and right temporal subarachnoid hemorrhage and right frontal subdural hematoma.  Also with nondisplaced fracture of left occipital bone and neurosurgery placed on consult.  On visit this morning patient is awake, alert, and cooperative with her daughter present.  She continues to endorse amnesia of the events as well as mild headache and worsening left ear hearing loss. Patient denies shortness of breath, chest pain, abdominal pain, numbness or tingling in arms or legs, bowel or bladder changes.  BP has largely normalized with systolic under 160, INR 1.1.       Past Medical History  She has no past medical history on file.    Surgical History  She has a past surgical history that includes MR angio head wo IV contrast (4/26/2021) and MR angio neck wo IV contrast (4/26/2021).     Social History  She reports that she quit smoking about 24 years ago. Her smoking use included cigarettes. She started smoking about 54 years ago. She has a 15 pack-year smoking history. She has never used smokeless tobacco. No history on file for alcohol use and drug use.    Family History  No family history on file.     Allergies  Bactrim [sulfamethoxazole-trimethoprim], Cefdinir, and Wellbutrin [bupropion hcl]    Review of Systems  14/14 systems reviewed and negative other than what is listed in the history of present illness       Physical Exam  General: Well developed, awake/alert/oriented x3, no distress, alert and cooperative  Skin: Warm and dry, no lesions, no rashes  ENMT:  "Mucous membranes moist, no apparent injury, no lesions seen  Head/Neck: Neck Supple, no apparent injury  Respiratory/Thorax: Normal breath sounds with good chest expansion, thorax symmetric  Cardiovascular: No pitting edema, no JVD  CNII-XI largely intact though very hard of hearing in left ear, no drift, no dysmetria    Motor Strength: 5/5 Throughout upper and lower extremities    Muscle Bulk: Normal and symmetric in all extremities    Sensation: intact to light touch throughout       Last Recorded Vitals  Blood pressure 141/85, pulse 78, temperature 36.6 °C (97.9 °F), temperature source Temporal, resp. rate 14, height 1.613 m (5' 3.5\"), weight 72.1 kg (158 lb 15.2 oz), SpO2 94%.    Relevant Results  CT chest abdomen pelvis w IV contrast    Result Date: 6/9/2024  Interpreted By:  Karishma Martin, STUDY: CT CHEST ABDOMEN PELVIS W IV CONTRAST; CT THORACIC SPINE WO IV CONTRAST; CT LUMBAR SPINE WO IV CONTRAST;  6/9/2024 5:53 pm   INDICATION: Signs/Symptoms:Trauma. Fall down stairs, laceration to back of head, loss of consciousness and vomiting; Signs/Symptoms:Fall down stairs, head injury, head laceration, loss of consciousness, vomiting   COMPARISON: Chest x-ray 04/26/2021. CT coronary artery calcium score 03/13/2019.   ACCESSION NUMBER(S): [id number]; [id number]; [id number]   ORDERING CLINICIAN: BRITTA ZHAO   TECHNIQUE: Axial CT of the chest, abdomen, and pelvis was obtained. Coronal and sagittal reconstructions were performed. Intravenous contrast material was administered without immediate complications. Multiplanar reformatted images of the thoracic spine and lumbar spine were obtained from concurrent CT chest/abdomen/pelvis.   FINDINGS: There is motion artifact. There is streak artifact from the patient's arms along the body and superimposed radiopaque densities.   CHEST:   LUNG/PLEURA/LARGE AIRWAYS: The trachea and the central airways are patent. There is biapical pleural scarring. No consolidation, " pleural effusion or pneumothorax.   VESSELS: No traumatic aortic injury is appreciated within the limitations of this non-EKG gated study.  The thoracic aorta is of normal course and caliber.     HEART: The heart is normal in size.   There is no pericardial effusion.   MEDIASTINUM AND MORENITA: Small hypodense nodules at the visualized thyroid gland, the largest measures 8 mm. No pneumomediastinum, abnormal mediastinal fluid collection or mediastinal hematoma are appreciated.  No mediastinal, hilar or biaxillary adenopathy is present.   There is a small hiatal hernia.   CHEST WALL AND LOWER NECK: No acute fracture or dislocation of the included osseous structures are appreciated.  The thoracic wall soft tissues are within normal limits.   THORACIC SPINE: The vertebral body heights and alignment are maintained.  There is no acute fracture.  There is  multilevel degenerative disc disease.  The prevertebral soft tissues are unremarkable.   ABDOMEN:   LIVER: No focal perfusion abnormality of the liver is appreciated to suggest contusion or laceration. There is no subcapsular hematoma, no perihepatic fluid collection.   GALLBLADDER: Present.   BILE DUCTS: No biliary ductal dilation.   PANCREAS: No peripancreatic inflammatory changes or fluid collections.   SPLEEN: No parenchymal perfusion deficit of the spleen is appreciated to suggest contusion or laceration. There is no subcapsular hematoma, no perisplenic fluid collection.   ADRENAL GLANDS: The bilateral adrenal glands are unremarkable in appearance.   KIDNEYS AND URETERS: No parenchymal perfusion deficit is appreciated in bilateral kidneys to suggest contusion or laceration. There is no subcapsular hematoma, no perinephric fluid collection.  No hydronephrosis or hydroureter. There are bilateral renal cysts measuring 1.6 cm the right kidney and 1.1 cm at the left kidney.   PELVIS:   BLADDER: Partially distended.   REPRODUCTIVE ORGANS: The uterus is surgically absent.    BOWEL: The stomach is partially distended. No bowel obstruction. The transverse colon and the descending colon are decompressed with diffuse wall thickening. There is colonic diverticulosis with no CT evidence for acute diverticulitis. There is a stool ball distending the rectum. The appendix is normal.   VESSELS: Atherosclerotic calcifications within the abdominal aorta and its branches. No abdominal aortic aneurysm. The principal vasculature of the abdomen and pelvis is patent as visualized. There is a circumaortic left renal vein.   PERITONEUM/RETROPERITONEUM/LYMPH NODES: There is no evidence of retroperitoneal hematoma.  There is no free fluid or free air.  No pathologically enlarged lymph nodes are identified.   BONES AND ABDOMINAL WALL: No evidence of acute fracture or dislocation of the included osseous structures. Moderate degenerative changes at the bilateral hips. There is a small fat containing umbilical hernia.   LUMBAR SPINE: There is 4 mm anterolisthesis of L3 on L4. The vertebral body heights are maintained. There is no acute fracture.  There is multilevel degenerative disc disease. Multilevel facet arthropathy is also present. The prevertebral soft tissues are unremarkable.       CHEST/THORACIC SPINE : 1.  No acute posttraumatic findings within the thorax. 2. Small nodules at the thyroid gland. Nonemergent thyroid ultrasound can be obtained for further characterization. 3. Small hiatal hernia. 4.  No acute fracture of the thoracic spine. Degenerative changes.   ABDOMEN - PELVIS/LUMBAR SPINE: 1.  No acute posttraumatic findings within the abdomen or pelvis. 2. Diffuse colonic wall thickening at the transverse colon and descending colon, may be secondary to underdistention versus colitis. 3. Colonic diverticulosis. 4. Stool ball distending the rectum. 5.  No acute fracture of the lumbar spine. Degenerative changes.     MACRO: None.   Signed by: Karishma Martin 6/9/2024 6:58 PM Dictation workstation:    [id number]    CT lumbar spine wo IV contrast    Result Date: 6/9/2024  Interpreted By:  Karishma Martin, STUDY: CT CHEST ABDOMEN PELVIS W IV CONTRAST; CT THORACIC SPINE WO IV CONTRAST; CT LUMBAR SPINE WO IV CONTRAST;  6/9/2024 5:53 pm   INDICATION: Signs/Symptoms:Trauma. Fall down stairs, laceration to back of head, loss of consciousness and vomiting; Signs/Symptoms:Fall down stairs, head injury, head laceration, loss of consciousness, vomiting   COMPARISON: Chest x-ray 04/26/2021. CT coronary artery calcium score 03/13/2019.   ACCESSION NUMBER(S): [id number]; [id number]; [id number]   ORDERING CLINICIAN: BRITTA ZHAO   TECHNIQUE: Axial CT of the chest, abdomen, and pelvis was obtained. Coronal and sagittal reconstructions were performed. Intravenous contrast material was administered without immediate complications. Multiplanar reformatted images of the thoracic spine and lumbar spine were obtained from concurrent CT chest/abdomen/pelvis.   FINDINGS: There is motion artifact. There is streak artifact from the patient's arms along the body and superimposed radiopaque densities.   CHEST:   LUNG/PLEURA/LARGE AIRWAYS: The trachea and the central airways are patent. There is biapical pleural scarring. No consolidation, pleural effusion or pneumothorax.   VESSELS: No traumatic aortic injury is appreciated within the limitations of this non-EKG gated study.  The thoracic aorta is of normal course and caliber.     HEART: The heart is normal in size.   There is no pericardial effusion.   MEDIASTINUM AND MORENITA: Small hypodense nodules at the visualized thyroid gland, the largest measures 8 mm. No pneumomediastinum, abnormal mediastinal fluid collection or mediastinal hematoma are appreciated.  No mediastinal, hilar or biaxillary adenopathy is present.   There is a small hiatal hernia.   CHEST WALL AND LOWER NECK: No acute fracture or dislocation of the included osseous structures are appreciated.  The thoracic wall soft  tissues are within normal limits.   THORACIC SPINE: The vertebral body heights and alignment are maintained.  There is no acute fracture.  There is  multilevel degenerative disc disease.  The prevertebral soft tissues are unremarkable.   ABDOMEN:   LIVER: No focal perfusion abnormality of the liver is appreciated to suggest contusion or laceration. There is no subcapsular hematoma, no perihepatic fluid collection.   GALLBLADDER: Present.   BILE DUCTS: No biliary ductal dilation.   PANCREAS: No peripancreatic inflammatory changes or fluid collections.   SPLEEN: No parenchymal perfusion deficit of the spleen is appreciated to suggest contusion or laceration. There is no subcapsular hematoma, no perisplenic fluid collection.   ADRENAL GLANDS: The bilateral adrenal glands are unremarkable in appearance.   KIDNEYS AND URETERS: No parenchymal perfusion deficit is appreciated in bilateral kidneys to suggest contusion or laceration. There is no subcapsular hematoma, no perinephric fluid collection.  No hydronephrosis or hydroureter. There are bilateral renal cysts measuring 1.6 cm the right kidney and 1.1 cm at the left kidney.   PELVIS:   BLADDER: Partially distended.   REPRODUCTIVE ORGANS: The uterus is surgically absent.   BOWEL: The stomach is partially distended. No bowel obstruction. The transverse colon and the descending colon are decompressed with diffuse wall thickening. There is colonic diverticulosis with no CT evidence for acute diverticulitis. There is a stool ball distending the rectum. The appendix is normal.   VESSELS: Atherosclerotic calcifications within the abdominal aorta and its branches. No abdominal aortic aneurysm. The principal vasculature of the abdomen and pelvis is patent as visualized. There is a circumaortic left renal vein.   PERITONEUM/RETROPERITONEUM/LYMPH NODES: There is no evidence of retroperitoneal hematoma.  There is no free fluid or free air.  No pathologically enlarged lymph nodes  are identified.   BONES AND ABDOMINAL WALL: No evidence of acute fracture or dislocation of the included osseous structures. Moderate degenerative changes at the bilateral hips. There is a small fat containing umbilical hernia.   LUMBAR SPINE: There is 4 mm anterolisthesis of L3 on L4. The vertebral body heights are maintained. There is no acute fracture.  There is multilevel degenerative disc disease. Multilevel facet arthropathy is also present. The prevertebral soft tissues are unremarkable.       CHEST/THORACIC SPINE : 1.  No acute posttraumatic findings within the thorax. 2. Small nodules at the thyroid gland. Nonemergent thyroid ultrasound can be obtained for further characterization. 3. Small hiatal hernia. 4.  No acute fracture of the thoracic spine. Degenerative changes.   ABDOMEN - PELVIS/LUMBAR SPINE: 1.  No acute posttraumatic findings within the abdomen or pelvis. 2. Diffuse colonic wall thickening at the transverse colon and descending colon, may be secondary to underdistention versus colitis. 3. Colonic diverticulosis. 4. Stool ball distending the rectum. 5.  No acute fracture of the lumbar spine. Degenerative changes.     MACRO: None.   Signed by: Karishma Martin 6/9/2024 6:58 PM Dictation workstation:   [id number]    CT thoracic spine wo IV contrast    Result Date: 6/9/2024  Interpreted By:  Karishma Martin, STUDY: CT CHEST ABDOMEN PELVIS W IV CONTRAST; CT THORACIC SPINE WO IV CONTRAST; CT LUMBAR SPINE WO IV CONTRAST;  6/9/2024 5:53 pm   INDICATION: Signs/Symptoms:Trauma. Fall down stairs, laceration to back of head, loss of consciousness and vomiting; Signs/Symptoms:Fall down stairs, head injury, head laceration, loss of consciousness, vomiting   COMPARISON: Chest x-ray 04/26/2021. CT coronary artery calcium score 03/13/2019.   ACCESSION NUMBER(S): [id number]; [id number]; [id number]   ORDERING CLINICIAN: BRITTA ZHAO   TECHNIQUE: Axial CT of the chest, abdomen, and pelvis was obtained.  Coronal and sagittal reconstructions were performed. Intravenous contrast material was administered without immediate complications. Multiplanar reformatted images of the thoracic spine and lumbar spine were obtained from concurrent CT chest/abdomen/pelvis.   FINDINGS: There is motion artifact. There is streak artifact from the patient's arms along the body and superimposed radiopaque densities.   CHEST:   LUNG/PLEURA/LARGE AIRWAYS: The trachea and the central airways are patent. There is biapical pleural scarring. No consolidation, pleural effusion or pneumothorax.   VESSELS: No traumatic aortic injury is appreciated within the limitations of this non-EKG gated study.  The thoracic aorta is of normal course and caliber.     HEART: The heart is normal in size.   There is no pericardial effusion.   MEDIASTINUM AND MORENITA: Small hypodense nodules at the visualized thyroid gland, the largest measures 8 mm. No pneumomediastinum, abnormal mediastinal fluid collection or mediastinal hematoma are appreciated.  No mediastinal, hilar or biaxillary adenopathy is present.   There is a small hiatal hernia.   CHEST WALL AND LOWER NECK: No acute fracture or dislocation of the included osseous structures are appreciated.  The thoracic wall soft tissues are within normal limits.   THORACIC SPINE: The vertebral body heights and alignment are maintained.  There is no acute fracture.  There is  multilevel degenerative disc disease.  The prevertebral soft tissues are unremarkable.   ABDOMEN:   LIVER: No focal perfusion abnormality of the liver is appreciated to suggest contusion or laceration. There is no subcapsular hematoma, no perihepatic fluid collection.   GALLBLADDER: Present.   BILE DUCTS: No biliary ductal dilation.   PANCREAS: No peripancreatic inflammatory changes or fluid collections.   SPLEEN: No parenchymal perfusion deficit of the spleen is appreciated to suggest contusion or laceration. There is no subcapsular hematoma, no  perisplenic fluid collection.   ADRENAL GLANDS: The bilateral adrenal glands are unremarkable in appearance.   KIDNEYS AND URETERS: No parenchymal perfusion deficit is appreciated in bilateral kidneys to suggest contusion or laceration. There is no subcapsular hematoma, no perinephric fluid collection.  No hydronephrosis or hydroureter. There are bilateral renal cysts measuring 1.6 cm the right kidney and 1.1 cm at the left kidney.   PELVIS:   BLADDER: Partially distended.   REPRODUCTIVE ORGANS: The uterus is surgically absent.   BOWEL: The stomach is partially distended. No bowel obstruction. The transverse colon and the descending colon are decompressed with diffuse wall thickening. There is colonic diverticulosis with no CT evidence for acute diverticulitis. There is a stool ball distending the rectum. The appendix is normal.   VESSELS: Atherosclerotic calcifications within the abdominal aorta and its branches. No abdominal aortic aneurysm. The principal vasculature of the abdomen and pelvis is patent as visualized. There is a circumaortic left renal vein.   PERITONEUM/RETROPERITONEUM/LYMPH NODES: There is no evidence of retroperitoneal hematoma.  There is no free fluid or free air.  No pathologically enlarged lymph nodes are identified.   BONES AND ABDOMINAL WALL: No evidence of acute fracture or dislocation of the included osseous structures. Moderate degenerative changes at the bilateral hips. There is a small fat containing umbilical hernia.   LUMBAR SPINE: There is 4 mm anterolisthesis of L3 on L4. The vertebral body heights are maintained. There is no acute fracture.  There is multilevel degenerative disc disease. Multilevel facet arthropathy is also present. The prevertebral soft tissues are unremarkable.       CHEST/THORACIC SPINE : 1.  No acute posttraumatic findings within the thorax. 2. Small nodules at the thyroid gland. Nonemergent thyroid ultrasound can be obtained for further characterization. 3.  Small hiatal hernia. 4.  No acute fracture of the thoracic spine. Degenerative changes.   ABDOMEN - PELVIS/LUMBAR SPINE: 1.  No acute posttraumatic findings within the abdomen or pelvis. 2. Diffuse colonic wall thickening at the transverse colon and descending colon, may be secondary to underdistention versus colitis. 3. Colonic diverticulosis. 4. Stool ball distending the rectum. 5.  No acute fracture of the lumbar spine. Degenerative changes.     MACRO: None.   Signed by: Karishma Martin 6/9/2024 6:58 PM Dictation workstation:   [id number]    CT head W O contrast trauma protocol    Result Date: 6/9/2024  Interpreted By:  Karishma Martin, STUDY: CT HEAD W/O CONTRAST TRAUMA PROTOCOL; CT CERVICAL SPINE WO IV CONTRAST;  6/9/2024 5:41 pm   INDICATION: Signs/Symptoms:Fall down stairs, laceration to back of head, loss of consciousness and vomiting.   COMPARISON: CT head 04/26/2021   ACCESSION NUMBER(S): [id number]; [id number]   ORDERING CLINICIAN: BRITTA ZHAO   TECHNIQUE: Axial noncontrast images of the head  with coronal  and sagittal reconstructed images . Axial noncontrast images of the cervical spine with coronal and sagittal reconstructed images.   FINDINGS: There is streak artifact from the patient's dental amalgam.   BRAIN PARENCHYMA:  The gray white matter differentiation is preserved. No acute territorial infarct. Periventricular white matter hypodensities are probably representing chronic microvascular ischemic changes. No mass effect or midline shift. There is trace amount of pneumocephalus at the left posterior fossa.   HEMORRHAGE: There is a small amount of acute subarachnoid hemorrhage at the right frontal and temporal regions. There is trace amount of acute subdural hematoma at the right frontal region and right temporal region measuring 3 mm in thickness. VENTRICLES AND EXTRA-AXIAL SPACES: The ventricles are prominent, suggestive of diffuse parenchymal volume loss. No evidence of abnormal extraaxial  fluid collection. PARANASAL SINUSES AND MASTOIDS: No air-fluid levels at the visualized paranasal sinuses. The right mastoid air cells are clear. There is partial fluid opacification of the left mastoid air cells with fluid at the left middle ear. There is also fluid at the left external auditory canal. CALVARIUM: There is an acute nondisplaced fracture of the left occipital bone. There is a linear lucency at the left temporal bone, suggestive of an acute nondisplaced transverse fracture   EXTRACRANIAL SOFT TISSUES: There is a small amount of subcutaneous gas at the soft tissues of the left occipital region.   OTHER FINDINGS: None     Brain Injury (BIG) guidelines CT values:   Skull fracture: No SDH (subdural hematoma): <=4mm EDH (epidural hematoma): None detected IPH (intraparenchymal hemorrhage): None detected SAH (subarachnoid hemorrhage): Scattered IVH (intraventricular hemorrhage): No   Reference: Eren CHERY, Wyatt RS, Saniya M, et al. The BIG (brain injury guidelines) project: defining the management of traumatic brain injury by acute care surgeons. J Trauma Acute Care Surg. 2014;76:924a860.     CERVICAL SPINE: ALIGNMENT: Within normal limits. VERTEBRAE: No acute fracture. Degenerative changes at C1-C2 with pannus formation. DISCS: There is multilevel degenerative disc disease with osteophytosis. There is multilevel facet arthropathy. PREVERTEBRAL SOFT TISSUES: No prevertebral soft tissue swelling. LUNG APICES: There is biapical pleural scarring.         1. Small amount of acute subarachnoid hemorrhage at the right frontal and right temporal regions. 2. Trace amount of acute subdural hematoma at the right frontal region and right temporal region. 3. Acute nondisplaced fracture at the left occipital bone. Trace amount of pneumocephalus at the left posterior fossa. 4. Acute nondisplaced fracture at the left temporal bone with partial fluid opacification of the left mastoid air cell, fluid at the left middle ear  and fluid at the left external auditory canal. Please correlate with clinical exam for hemotympanum. 5. Small amount of subcutaneous emphysema at the scalp at the left occipital region, suggestive of soft tissue laceration. 6.  No acute fracture at the cervical spine. Degenerative changes.     MACRO: Karishma Martin discussed the significance and urgency of this critical finding by telephone with  BRITTA ZHAO on 6/9/2024 at 6:28 pm.  (**-RCF-**) Findings:  See findings.     Signed by: Karishma Martin 6/9/2024 6:30 PM Dictation workstation:   [id number]    CT cervical spine wo IV contrast    Result Date: 6/9/2024  Interpreted By:  Karishma Martin, STUDY: CT HEAD W/O CONTRAST TRAUMA PROTOCOL; CT CERVICAL SPINE WO IV CONTRAST;  6/9/2024 5:41 pm   INDICATION: Signs/Symptoms:Fall down stairs, laceration to back of head, loss of consciousness and vomiting.   COMPARISON: CT head 04/26/2021   ACCESSION NUMBER(S): [id number]; [id number]   ORDERING CLINICIAN: BRITTA ZHAO   TECHNIQUE: Axial noncontrast images of the head  with coronal  and sagittal reconstructed images . Axial noncontrast images of the cervical spine with coronal and sagittal reconstructed images.   FINDINGS: There is streak artifact from the patient's dental amalgam.   BRAIN PARENCHYMA:  The gray white matter differentiation is preserved. No acute territorial infarct. Periventricular white matter hypodensities are probably representing chronic microvascular ischemic changes. No mass effect or midline shift. There is trace amount of pneumocephalus at the left posterior fossa.   HEMORRHAGE: There is a small amount of acute subarachnoid hemorrhage at the right frontal and temporal regions. There is trace amount of acute subdural hematoma at the right frontal region and right temporal region measuring 3 mm in thickness. VENTRICLES AND EXTRA-AXIAL SPACES: The ventricles are prominent, suggestive of diffuse parenchymal volume loss. No evidence of abnormal  extraaxial fluid collection. PARANASAL SINUSES AND MASTOIDS: No air-fluid levels at the visualized paranasal sinuses. The right mastoid air cells are clear. There is partial fluid opacification of the left mastoid air cells with fluid at the left middle ear. There is also fluid at the left external auditory canal. CALVARIUM: There is an acute nondisplaced fracture of the left occipital bone. There is a linear lucency at the left temporal bone, suggestive of an acute nondisplaced transverse fracture   EXTRACRANIAL SOFT TISSUES: There is a small amount of subcutaneous gas at the soft tissues of the left occipital region.   OTHER FINDINGS: None     Brain Injury (BIG) guidelines CT values:   Skull fracture: No SDH (subdural hematoma): <=4mm EDH (epidural hematoma): None detected IPH (intraparenchymal hemorrhage): None detected SAH (subarachnoid hemorrhage): Scattered IVH (intraventricular hemorrhage): No   Reference: Eren CHERY, Wyatt RS, Saniya M, et al. The BIG (brain injury guidelines) project: defining the management of traumatic brain injury by acute care surgeons. J Trauma Acute Care Surg. 2014;76:070r077.     CERVICAL SPINE: ALIGNMENT: Within normal limits. VERTEBRAE: No acute fracture. Degenerative changes at C1-C2 with pannus formation. DISCS: There is multilevel degenerative disc disease with osteophytosis. There is multilevel facet arthropathy. PREVERTEBRAL SOFT TISSUES: No prevertebral soft tissue swelling. LUNG APICES: There is biapical pleural scarring.         1. Small amount of acute subarachnoid hemorrhage at the right frontal and right temporal regions. 2. Trace amount of acute subdural hematoma at the right frontal region and right temporal region. 3. Acute nondisplaced fracture at the left occipital bone. Trace amount of pneumocephalus at the left posterior fossa. 4. Acute nondisplaced fracture at the left temporal bone with partial fluid opacification of the left mastoid air cell, fluid at the left  middle ear and fluid at the left external auditory canal. Please correlate with clinical exam for hemotympanum. 5. Small amount of subcutaneous emphysema at the scalp at the left occipital region, suggestive of soft tissue laceration. 6.  No acute fracture at the cervical spine. Degenerative changes.     MACRO: Karishma Martin discussed the significance and urgency of this critical finding by telephone with  BRITTA ZHAO on 6/9/2024 at 6:28 pm.  (**-RCF-**) Findings:  See findings.     Signed by: Karishma Martin 6/9/2024 6:30 PM Dictation workstation:   [id number]        Assessment/Plan     Acute traumatic subarachnoid, subdural hemorrhage  Non-coagulopathic lab  Intact neuroexam with no appreciable deficits  Repeat head CT performed and on personal review with no increase in hemorrhage, awaiting radiology read  If stable may continue every 4 hours neurochecks and downgrade to floor or dispo per medicine  Repeat head CT outpatient in 2 weeks and may follow in office after      Yair Serrato PA-C

## 2024-06-10 NOTE — PROGRESS NOTES
06/10/24 1032   Discharge Planning   Living Arrangements Spouse/significant other   Support Systems Spouse/significant other   Type of Residence Private residence   Home or Post Acute Services Post acute facilities (Rehab/SNF/etc)   Type of Post Acute Facility Services Rehab   Does the patient need discharge transport arranged? Yes   RoundTrip coordination needed? Yes     Met with patient and daughters at bedside. Pt admitted with subarachnoid hemorrhage after a fall. Pt lives with spouse and was independent PTA with no HHC or DME. PCP is Dr Serrano. Pt is able to manage her health and understands her medications. Was able to drive and obtain meds. Pt does go to outpatient therapy for strengthening. Awaiting Neurosurgery and therapy recs. TCC team will continue to follow for potential discharge needs.

## 2024-06-10 NOTE — CARE PLAN
The patient's goals for the shift include      The clinical goals for the shift include patient will remain Neurologically stable and pain will be appropriately controlled throughout shift    Patient with stable neurological status repeat CT completed waiting on read by radiologist.    Problem: Skin  Goal: Decreased wound size/increased tissue granulation at next dressing change  Outcome: Progressing  Flowsheets (Taken 6/10/2024 1858)  Decreased wound size/increased tissue granulation at next dressing change:   Promote sleep for wound healing   Protective dressings over bony prominences   Utilize specialty bed per algorithm  Goal: Participates in plan/prevention/treatment measures  Outcome: Progressing  Flowsheets (Taken 6/10/2024 1858)  Participates in plan/prevention/treatment measures:   Elevate heels   Discuss with provider PT/OT consult   Increase activity/out of bed for meals  Goal: Prevent/manage excess moisture  Outcome: Progressing  Flowsheets (Taken 6/10/2024 1858)  Prevent/manage excess moisture:   Monitor for/manage infection if present   Cleanse incontinence/protect with barrier cream   Follow provider orders for dressing changes   Moisturize dry skin  Goal: Prevent/minimize sheer/friction injuries  Outcome: Progressing  Flowsheets (Taken 6/10/2024 1858)  Prevent/minimize sheer/friction injuries:   Complete micro-shifts as needed if patient unable. Adjust patient position to relieve pressure points, not a full turn   Increase activity/out of bed for meals   Use pull sheet   HOB 30 degrees or less   Turn/reposition every 2 hours/use positioning/transfer devices   Utilize specialty bed per algorithm  Goal: Promote/optimize nutrition  Outcome: Progressing  Flowsheets (Taken 6/10/2024 1858)  Promote/optimize nutrition:   Assist with feeding   Monitor/record intake including meals   Offer water/supplements/favorite foods   Discuss with provider if NPO > 2 days   Reassess MST if dietician not consulted  Goal:  Promote skin healing  Outcome: Progressing  Flowsheets (Taken 6/10/2024 1862)  Promote skin healing:   Assess skin/pad under line(s)/device(s)   Protective dressings over bony prominences   Turn/reposition every 2 hours/use positioning/transfer devices   Ensure correct size (line/device) and apply per  instructions   Rotate device position/do not position patient on device     Problem: Fall/Injury  Goal: Not fall by end of shift  Outcome: Progressing  Goal: Be free from injury by end of the shift  Outcome: Progressing  Goal: Verbalize understanding of personal risk factors for fall in the hospital  Outcome: Progressing  Goal: Verbalize understanding of risk factor reduction measures to prevent injury from fall in the home  Outcome: Progressing  Goal: Use assistive devices by end of the shift  Outcome: Progressing  Goal: Pace activities to prevent fatigue by end of the shift  Outcome: Progressing     Problem: Pain  Goal: My pain/discomfort is manageable  Outcome: Progressing     Problem: Safety  Goal: Patient will be injury free during hospitalization  Outcome: Progressing  Goal: I will remain free of falls  Outcome: Progressing     Problem: Daily Care  Goal: Daily care needs are met  Outcome: Progressing     Problem: Psychosocial Needs  Goal: Demonstrates ability to cope with hospitalization/illness  Outcome: Progressing  Goal: Collaborate with me, my family, and caregiver to identify my specific goals  Outcome: Progressing

## 2024-06-11 ENCOUNTER — APPOINTMENT (OUTPATIENT)
Dept: CARDIOLOGY | Facility: HOSPITAL | Age: 74
DRG: 087 | End: 2024-06-11
Payer: MEDICARE

## 2024-06-11 VITALS
RESPIRATION RATE: 16 BRPM | SYSTOLIC BLOOD PRESSURE: 130 MMHG | BODY MASS INDEX: 26.61 KG/M2 | HEIGHT: 64 IN | OXYGEN SATURATION: 95 % | TEMPERATURE: 96.8 F | DIASTOLIC BLOOD PRESSURE: 84 MMHG | WEIGHT: 155.87 LBS | HEART RATE: 78 BPM

## 2024-06-11 LAB
ALBUMIN SERPL BCP-MCNC: 3.5 G/DL (ref 3.4–5)
ALP SERPL-CCNC: 51 U/L (ref 33–136)
ALT SERPL W P-5'-P-CCNC: 11 U/L (ref 7–45)
ANION GAP SERPL CALC-SCNC: 13 MMOL/L (ref 10–20)
AST SERPL W P-5'-P-CCNC: 13 U/L (ref 9–39)
BASOPHILS # BLD AUTO: 0.04 X10*3/UL (ref 0–0.1)
BASOPHILS NFR BLD AUTO: 0.3 %
BILIRUB SERPL-MCNC: 0.5 MG/DL (ref 0–1.2)
BUN SERPL-MCNC: 18 MG/DL (ref 6–23)
CALCIUM SERPL-MCNC: 9.1 MG/DL (ref 8.6–10.3)
CHLORIDE SERPL-SCNC: 103 MMOL/L (ref 98–107)
CO2 SERPL-SCNC: 25 MMOL/L (ref 21–32)
CREAT SERPL-MCNC: 1.04 MG/DL (ref 0.5–1.05)
EGFRCR SERPLBLD CKD-EPI 2021: 57 ML/MIN/1.73M*2
EOSINOPHIL # BLD AUTO: 0.08 X10*3/UL (ref 0–0.4)
EOSINOPHIL NFR BLD AUTO: 0.7 %
ERYTHROCYTE [DISTWIDTH] IN BLOOD BY AUTOMATED COUNT: 15.7 % (ref 11.5–14.5)
GLUCOSE BLD MANUAL STRIP-MCNC: 158 MG/DL (ref 74–99)
GLUCOSE BLD MANUAL STRIP-MCNC: 160 MG/DL (ref 74–99)
GLUCOSE BLD MANUAL STRIP-MCNC: 168 MG/DL (ref 74–99)
GLUCOSE SERPL-MCNC: 151 MG/DL (ref 74–99)
HCT VFR BLD AUTO: 31.5 % (ref 36–46)
HGB BLD-MCNC: 10.2 G/DL (ref 12–16)
IMM GRANULOCYTES # BLD AUTO: 0.06 X10*3/UL (ref 0–0.5)
IMM GRANULOCYTES NFR BLD AUTO: 0.5 % (ref 0–0.9)
LYMPHOCYTES # BLD AUTO: 1.46 X10*3/UL (ref 0.8–3)
LYMPHOCYTES NFR BLD AUTO: 12.2 %
MAGNESIUM SERPL-MCNC: 1.51 MG/DL (ref 1.6–2.4)
MCH RBC QN AUTO: 26.9 PG (ref 26–34)
MCHC RBC AUTO-ENTMCNC: 32.4 G/DL (ref 32–36)
MCV RBC AUTO: 83 FL (ref 80–100)
MONOCYTES # BLD AUTO: 1.01 X10*3/UL (ref 0.05–0.8)
MONOCYTES NFR BLD AUTO: 8.4 %
NEUTROPHILS # BLD AUTO: 9.33 X10*3/UL (ref 1.6–5.5)
NEUTROPHILS NFR BLD AUTO: 77.9 %
NRBC BLD-RTO: 0 /100 WBCS (ref 0–0)
PHOSPHATE SERPL-MCNC: 3.7 MG/DL (ref 2.5–4.9)
PLATELET # BLD AUTO: 232 X10*3/UL (ref 150–450)
POTASSIUM SERPL-SCNC: 3.6 MMOL/L (ref 3.5–5.3)
PROT SERPL-MCNC: 6.3 G/DL (ref 6.4–8.2)
RBC # BLD AUTO: 3.79 X10*6/UL (ref 4–5.2)
SODIUM SERPL-SCNC: 137 MMOL/L (ref 136–145)
WBC # BLD AUTO: 12 X10*3/UL (ref 4.4–11.3)

## 2024-06-11 PROCEDURE — 85025 COMPLETE CBC W/AUTO DIFF WBC: CPT | Performed by: EMERGENCY MEDICINE

## 2024-06-11 PROCEDURE — 97116 GAIT TRAINING THERAPY: CPT | Mod: GP | Performed by: PHYSICAL THERAPIST

## 2024-06-11 PROCEDURE — 97530 THERAPEUTIC ACTIVITIES: CPT | Mod: GP | Performed by: PHYSICAL THERAPIST

## 2024-06-11 PROCEDURE — 36415 COLL VENOUS BLD VENIPUNCTURE: CPT | Performed by: EMERGENCY MEDICINE

## 2024-06-11 PROCEDURE — 93325 DOPPLER ECHO COLOR FLOW MAPG: CPT

## 2024-06-11 PROCEDURE — 2500000001 HC RX 250 WO HCPCS SELF ADMINISTERED DRUGS (ALT 637 FOR MEDICARE OP): Performed by: EMERGENCY MEDICINE

## 2024-06-11 PROCEDURE — 83735 ASSAY OF MAGNESIUM: CPT | Performed by: EMERGENCY MEDICINE

## 2024-06-11 PROCEDURE — 2500000004 HC RX 250 GENERAL PHARMACY W/ HCPCS (ALT 636 FOR OP/ED): Performed by: NURSE PRACTITIONER

## 2024-06-11 PROCEDURE — 99231 SBSQ HOSP IP/OBS SF/LOW 25: CPT | Performed by: PHYSICIAN ASSISTANT

## 2024-06-11 PROCEDURE — 2500000002 HC RX 250 W HCPCS SELF ADMINISTERED DRUGS (ALT 637 FOR MEDICARE OP, ALT 636 FOR OP/ED): Performed by: NURSE PRACTITIONER

## 2024-06-11 PROCEDURE — 82947 ASSAY GLUCOSE BLOOD QUANT: CPT | Mod: 91

## 2024-06-11 PROCEDURE — 93321 DOPPLER ECHO F-UP/LMTD STD: CPT

## 2024-06-11 PROCEDURE — 80053 COMPREHEN METABOLIC PANEL: CPT | Performed by: EMERGENCY MEDICINE

## 2024-06-11 PROCEDURE — 97535 SELF CARE MNGMENT TRAINING: CPT | Mod: GO,CO

## 2024-06-11 PROCEDURE — 84100 ASSAY OF PHOSPHORUS: CPT | Performed by: EMERGENCY MEDICINE

## 2024-06-11 PROCEDURE — 2500000004 HC RX 250 GENERAL PHARMACY W/ HCPCS (ALT 636 FOR OP/ED)

## 2024-06-11 PROCEDURE — 2500000001 HC RX 250 WO HCPCS SELF ADMINISTERED DRUGS (ALT 637 FOR MEDICARE OP): Performed by: INTERNAL MEDICINE

## 2024-06-11 PROCEDURE — 2500000002 HC RX 250 W HCPCS SELF ADMINISTERED DRUGS (ALT 637 FOR MEDICARE OP, ALT 636 FOR OP/ED): Performed by: EMERGENCY MEDICINE

## 2024-06-11 PROCEDURE — 99239 HOSP IP/OBS DSCHRG MGMT >30: CPT | Performed by: INTERNAL MEDICINE

## 2024-06-11 RX ORDER — LIDOCAINE 560 MG/1
1 PATCH PERCUTANEOUS; TOPICAL; TRANSDERMAL DAILY
Qty: 30 PATCH | Refills: 0 | Status: SHIPPED | OUTPATIENT
Start: 2024-06-12 | End: 2024-07-12

## 2024-06-11 RX ORDER — AMLODIPINE BESYLATE 5 MG/1
5 TABLET ORAL DAILY
Status: DISCONTINUED | OUTPATIENT
Start: 2024-06-11 | End: 2024-06-11 | Stop reason: HOSPADM

## 2024-06-11 RX ORDER — OFLOXACIN 3 MG/ML
5 SOLUTION AURICULAR (OTIC) 2 TIMES DAILY
Qty: 15 ML | Refills: 0 | Status: SHIPPED | OUTPATIENT
Start: 2024-06-11

## 2024-06-11 RX ORDER — LABETALOL HYDROCHLORIDE 5 MG/ML
5 INJECTION, SOLUTION INTRAVENOUS ONCE
Status: COMPLETED | OUTPATIENT
Start: 2024-06-11 | End: 2024-06-11

## 2024-06-11 RX ORDER — AMLODIPINE BESYLATE 5 MG/1
5 TABLET ORAL DAILY
Qty: 30 TABLET | Refills: 0 | Status: SHIPPED | OUTPATIENT
Start: 2024-06-11 | End: 2024-07-11

## 2024-06-11 RX ORDER — ONDANSETRON 4 MG/1
4 TABLET, FILM COATED ORAL EVERY 8 HOURS PRN
Qty: 20 TABLET | Refills: 0 | Status: SHIPPED | OUTPATIENT
Start: 2024-06-11

## 2024-06-11 RX ORDER — LABETALOL HYDROCHLORIDE 5 MG/ML
10 INJECTION, SOLUTION INTRAVENOUS ONCE
Status: COMPLETED | OUTPATIENT
Start: 2024-06-11 | End: 2024-06-11

## 2024-06-11 RX ORDER — POTASSIUM CHLORIDE 20 MEQ/1
20 TABLET, EXTENDED RELEASE ORAL ONCE
Status: COMPLETED | OUTPATIENT
Start: 2024-06-11 | End: 2024-06-11

## 2024-06-11 RX ORDER — LANOLIN ALCOHOL/MO/W.PET/CERES
400 CREAM (GRAM) TOPICAL ONCE
Status: COMPLETED | OUTPATIENT
Start: 2024-06-11 | End: 2024-06-11

## 2024-06-11 RX ORDER — OFLOXACIN 3 MG/ML
5 SOLUTION AURICULAR (OTIC) 2 TIMES DAILY
Status: DISCONTINUED | OUTPATIENT
Start: 2024-06-11 | End: 2024-06-11 | Stop reason: HOSPADM

## 2024-06-11 RX ORDER — ACETAMINOPHEN 325 MG/1
650 TABLET ORAL ONCE
Status: COMPLETED | OUTPATIENT
Start: 2024-06-11 | End: 2024-06-11

## 2024-06-11 RX ADMIN — ACETAMINOPHEN 650 MG: 325 TABLET ORAL at 00:39

## 2024-06-11 RX ADMIN — INSULIN LISPRO 1 UNITS: 100 INJECTION, SOLUTION INTRAVENOUS; SUBCUTANEOUS at 08:48

## 2024-06-11 RX ADMIN — CYANOCOBALAMIN TAB 1000 MCG 1000 MCG: 1000 TAB at 08:47

## 2024-06-11 RX ADMIN — INSULIN LISPRO 1 UNITS: 100 INJECTION, SOLUTION INTRAVENOUS; SUBCUTANEOUS at 00:41

## 2024-06-11 RX ADMIN — THIAMINE HCL TAB 100 MG 100 MG: 100 TAB at 08:47

## 2024-06-11 RX ADMIN — FLUOROURACIL 1 APPLICATION: 50 CREAM TOPICAL at 09:00

## 2024-06-11 RX ADMIN — SITAGLIPTIN 50 MG: 50 TABLET, FILM COATED ORAL at 08:47

## 2024-06-11 RX ADMIN — CITALOPRAM HYDROBROMIDE 10 MG: 20 TABLET ORAL at 08:47

## 2024-06-11 RX ADMIN — AMLODIPINE BESYLATE 5 MG: 5 TABLET ORAL at 12:00

## 2024-06-11 RX ADMIN — PANTOPRAZOLE SODIUM 40 MG: 40 TABLET, DELAYED RELEASE ORAL at 06:08

## 2024-06-11 RX ADMIN — ACETAMINOPHEN 650 MG: 325 TABLET ORAL at 12:00

## 2024-06-11 RX ADMIN — INSULIN LISPRO 1 UNITS: 100 INJECTION, SOLUTION INTRAVENOUS; SUBCUTANEOUS at 04:25

## 2024-06-11 RX ADMIN — POTASSIUM CHLORIDE 20 MEQ: 1500 TABLET, EXTENDED RELEASE ORAL at 04:01

## 2024-06-11 RX ADMIN — Medication 400 MG: at 04:01

## 2024-06-11 RX ADMIN — LABETALOL HYDROCHLORIDE 5 MG: 5 INJECTION, SOLUTION INTRAVENOUS at 00:45

## 2024-06-11 RX ADMIN — LABETALOL HYDROCHLORIDE 10 MG: 5 INJECTION, SOLUTION INTRAVENOUS at 01:28

## 2024-06-11 RX ADMIN — LOSARTAN POTASSIUM 100 MG: 100 TABLET, FILM COATED ORAL at 09:00

## 2024-06-11 ASSESSMENT — COGNITIVE AND FUNCTIONAL STATUS - GENERAL
DRESSING REGULAR LOWER BODY CLOTHING: A LOT
WALKING IN HOSPITAL ROOM: A LITTLE
HELP NEEDED FOR BATHING: A LOT
PERSONAL GROOMING: A LITTLE
MOVING TO AND FROM BED TO CHAIR: A LOT
TURNING FROM BACK TO SIDE WHILE IN FLAT BAD: A LITTLE
PERSONAL GROOMING: A LOT
TOILETING: A LOT
CLIMB 3 TO 5 STEPS WITH RAILING: A LOT
DAILY ACTIVITIY SCORE: 17
DRESSING REGULAR UPPER BODY CLOTHING: A LOT
DRESSING REGULAR LOWER BODY CLOTHING: A LOT
STANDING UP FROM CHAIR USING ARMS: A LOT
MOVING FROM LYING ON BACK TO SITTING ON SIDE OF FLAT BED WITH BEDRAILS: A LITTLE
MOVING FROM LYING ON BACK TO SITTING ON SIDE OF FLAT BED WITH BEDRAILS: A LOT
STANDING UP FROM CHAIR USING ARMS: A LITTLE
TURNING FROM BACK TO SIDE WHILE IN FLAT BAD: A LOT
MOBILITY SCORE: 12
CLIMB 3 TO 5 STEPS WITH RAILING: A LOT
HELP NEEDED FOR BATHING: A LOT
TOILETING: A LITTLE
WALKING IN HOSPITAL ROOM: A LOT
MOVING TO AND FROM BED TO CHAIR: A LITTLE
MOBILITY SCORE: 17
DRESSING REGULAR UPPER BODY CLOTHING: A LITTLE
DAILY ACTIVITIY SCORE: 14

## 2024-06-11 ASSESSMENT — PAIN - FUNCTIONAL ASSESSMENT
PAIN_FUNCTIONAL_ASSESSMENT: 0-10

## 2024-06-11 ASSESSMENT — PAIN SCALES - GENERAL
PAINLEVEL_OUTOF10: 4
PAINLEVEL_OUTOF10: 0 - NO PAIN
PAINLEVEL_OUTOF10: 5 - MODERATE PAIN
PAINLEVEL_OUTOF10: 0 - NO PAIN
PAINLEVEL_OUTOF10: 4
PAINLEVEL_OUTOF10: 5 - MODERATE PAIN

## 2024-06-11 ASSESSMENT — ACTIVITIES OF DAILY LIVING (ADL)
HOME_MANAGEMENT_TIME_ENTRY: 14
LACK_OF_TRANSPORTATION: NO

## 2024-06-11 ASSESSMENT — PAIN DESCRIPTION - DESCRIPTORS: DESCRIPTORS: ACHING

## 2024-06-11 ASSESSMENT — PAIN DESCRIPTION - LOCATION: LOCATION: HEAD

## 2024-06-11 ASSESSMENT — PAIN DESCRIPTION - ORIENTATION: ORIENTATION: LEFT

## 2024-06-11 NOTE — PROGRESS NOTES
Physical Therapy    Physical Therapy Treatment    Patient Name: Tracy Leung  MRN: [id number]  Today's Date: 6/11/2024  Time Calculation  Start Time: 1245  Stop Time: 1320  Time Calculation (min): 35 min    Assessment/Plan   PT Assessment  PT Assessment Results: Decreased strength, Decreased endurance, Impaired balance, Decreased mobility  Rehab Prognosis: Good  Evaluation/Treatment Tolerance: Patient tolerated treatment well  Medical Staff Made Aware: Yes  End of Session Communication: Bedside nurse  Assessment Comment: Pt is a 74 y.o. female admitted for Traumatic subarachnoid bleed with LOC of 30 minutes or less, sequela (CMS-[facility]) [S06.6X1S]  Traumatic subarachnoid hemorrhage with loss of consciousness of 30 minutes or less, initial encounter (Multi) [S06.6X1A] on 6/9/2024. Pt below functional level and will benefit from skilled therapy during stay to improve overall functional mobility, strength, ROM, endurance and safety awareness. Upon discharge pt will benefit from high intensity therapy for continued improvement in functional mobility. Therapy will continue to follow and reassess each session.  Pt has improved her functional mobility however is still a high falls risk. When patient was standing at the sink to wash her hands she developed a retro lean, and reached out to the sink to catch herself and required Phillip to prevent further loss of balance. Pt will benefit from continued monitoring at an acute rehab to be sure she is safe to return home.     End of Session Patient Position: Alarm on, Up in chair  PT Plan  Inpatient/Swing Bed or Outpatient: Inpatient  PT Plan  Treatment/Interventions: Bed mobility, Transfer training, Gait training, Stair training, Balance training, Strengthening, Therapeutic exercise, Therapeutic activity  PT Plan: Skilled PT  PT Frequency: Daily  PT Discharge Recommendations: High intensity level of continued care  Equipment Recommended upon Discharge: Wheeled walker  PT  Recommended Transfer Status: Assist x1  PT - OK to Discharge: Yes      General Visit Information:   PT  Visit  PT Received On: 06/11/24  General  Reason for Referral: impaired mobility  Referred By: Dr Quinn  Past Medical History Relevant to Rehab: SAH, SDH, fracture of temporal and occipital lobe secondary to a fall  Family/Caregiver Present: Yes  Caregiver Feedback: Caregiver asking if patient should go home with homecare vs acute rehab. This PT explained the differences and benefits of continued monitoring as she recovers from SAH, SDH, and skull fractures. Pt remains dizzy with head turns and downward gaze. (she improves with rest)  Co-Treatment: OT  Co-Treatment Reason: patient safety  Prior to Session Communication: Bedside nurse  Patient Position Received: Bed, 3 rail up  Preferred Learning Style: kinesthetic, verbal  General Comment: Pt agreeable to therapy    Subjective   Precautions:  Precautions  Medical Precautions: Fall precautions  Vital Signs:  Vital Signs  Heart Rate: 90  BP: 140/72 (supine; standing 152/87)    Objective   Pain:  Pain Assessment  Pain Assessment: 0-10  Pain Score: 4  Pain Type:  (headache acute pain, improved with Tylenol)  Cognition:  Cognition  Overall Cognitive Status: Within Functional Limits  Orientation Level: Oriented X4  Coordination:     Postural Control:  Static Sitting Balance  Static Sitting-Comment/Number of Minutes: good  Dynamic Sitting Balance  Dynamic Sitting-Comments: fair+  Static Standing Balance  Static Standing-Comment/Number of Minutes: fair  Dynamic Standing Balance  Dynamic Standing-Comments: fair  Extremity/Trunk Assessments:  RLE   RLE : Within Functional Limits  LLE   LLE : Within Functional Limits  Activity Tolerance:  Activity Tolerance  Endurance: Decreased tolerance for upright activites  Early Mobility/Exercise Safety Screen: Proceed with mobilization - No exclusion criteria met  Treatments:     Bed Mobility  Bed Mobility: Yes  Bed Mobility 1  Bed  Mobility 1: Supine to sitting  Level of Assistance 1: Minimum assistance, +2    Ambulation/Gait Training  Ambulation/Gait Training Performed: Yes  Ambulation/Gait Training 1  Surface 1: Level tile  Device 1: Rolling walker  Gait Support Devices: Gait belt  Assistance 1: Minimum assistance  Quality of Gait 1: Decreased step length  Comments/Distance (ft) 1: 80  Transfers  Transfer: Yes  Transfer 1  Transfer From 1: Bed to  Transfer to 1: Chair with arms  Technique 1: Sit to stand, Stand to sit  Transfer Device 1: Walker  Transfer Level of Assistance 1: Minimal verbal cues, Minimum assistance  Transfers 2  Transfer From 2: Stand to  Transfer to 2: Toilet  Technique 2: Stand to sit  Transfer Device 2: Walker (grab bar)  Transfer Level of Assistance 2: Minimum assistance, Minimal verbal cues  Trials/Comments 2: instructed to use grab bar to control descent to chair    Outcome Measures:     [facility] Basic Mobility  Turning from your back to your side while in a flat bed without using bedrails: A little  Moving from lying on your back to sitting on the side of a flat bed without using bedrails: A little  Moving to and from bed to chair (including a wheelchair): A little  Standing up from a chair using your arms (e.g. wheelchair or bedside chair): A little  To walk in hospital room: A little  Climbing 3-5 steps with railing: A lot  Basic Mobility - Total Score: 17    Education Documentation  Body Mechanics, taught by Kristie Simmons PT at 6/11/2024  2:56 PM.  Learner: Patient  Readiness: Acceptance  Method: Explanation  Response: Verbalizes Understanding, Needs Reinforcement    Home Exercise Program, taught by Kristie Simmons PT at 6/11/2024  2:56 PM.  Learner: Patient  Readiness: Acceptance  Method: Explanation  Response: Verbalizes Understanding, Needs Reinforcement    Mobility Training, taught by Kristie Simmons PT at 6/11/2024  2:56 PM.  Learner: Patient  Readiness: Acceptance  Method: Explanation  Response:  Verbalizes Understanding, Needs Reinforcement    Education Comments  No comments found.        Encounter Problems       Encounter Problems (Active)       PT Problem       Pt will perform independent bed mobility without use of handrails  (Progressing)       Start:  06/10/24    Expected End:  06/24/24            Pt will perform sit to stand with CGA with WW  (Progressing)       Start:  06/10/24    Expected End:  06/24/24            Pt will ambulate 150 ft with CGA with WW (Progressing)       Start:  06/10/24    Expected End:  06/24/24            Pt will ambulate up 2 stairs with handrail with CGA  (Progressing)       Start:  06/10/24    Expected End:  06/24/24

## 2024-06-11 NOTE — DISCHARGE SUMMARY
Discharge Diagnosis  Traumatic subarachnoid bleed with LOC of 30 minutes or less, sequela (CMS-HCC)    Issues Requiring Follow-Up  Follow-up with primary care provider after discharge  We recommend follow-up with neurosurgery in 2 weeks to repeat CAT scan before resuming aspirin and Celebrex  Continue to hold all blood thinners and NSAIDs altogether  Follow-up with ENT in 4 weeks if the ear bleeding is continuing.    Discharge Meds     Your medication list        START taking these medications        Instructions Last Dose Given Next Dose Due   amLODIPine 5 mg tablet  Commonly known as: Norvasc      Take 1 tablet (5 mg) by mouth once daily.       lidocaine 4 % patch  Start taking on: June 12, 2024      Place 1 patch over 12 hours on the skin once daily. Remove & discard patch within 12 hours or as directed by MD.       ofloxacin 0.3 % otic solution  Commonly known as: Floxin      Administer 5 drops into the left ear 2 times a day.       ondansetron 4 mg tablet  Commonly known as: Zofran      Take 1 tablet (4 mg) by mouth every 8 hours if needed for nausea or vomiting.              CONTINUE taking these medications        Instructions Last Dose Given Next Dose Due   allopurinol 300 mg tablet  Commonly known as: Zyloprim           atorvastatin 40 mg tablet  Commonly known as: Lipitor           biotin 1 mg capsule           citalopram 10 mg tablet  Commonly known as: CeleXA           cyanocobalamin 1,000 mcg tablet  Commonly known as: Vitamin B-12           fluorouracil 5 % cream  Commonly known as: Efudex           fluticasone 50 mcg/actuation nasal spray  Commonly known as: Flonase           losartan 100 mg tablet  Commonly known as: Cozaar           metFORMIN 500 mg tablet  Commonly known as: Glucophage           RABEprazole EC tablet  Commonly known as: Aciphex           semaglutide 2 mg/dose (8 mg/3 mL) pen injector           SITagliptin phosphate 50 mg tablet  Commonly known as: Januvia           thiamine 100 mg  tablet  Commonly known as: Vitamin B-1           zonisamide 100 mg capsule  Commonly known as: Zonegran                  STOP taking these medications      aspirin 81 mg EC tablet        celecoxib 200 mg capsule  Commonly known as: CeleBREX                  Where to Get Your Medications        These medications were sent to [id number] - [place], [place] - [address]  [address] [place] [address]      Phone: [phone]   amLODIPine 5 mg tablet  lidocaine 4 % patch  ofloxacin 0.3 % otic solution  ondansetron 4 mg tablet         Test Results Pending At Discharge  Pending Labs       No current pending labs.            Hospital Course  74-year-old female with past medical history of non-insulin-dependent diabetes mellitus, hyperlipidemia, hypertension, tremors, anxiety, CKD stage III, TIA presented to emergency department after falling 3-4 steps landing on her back of the head. Patient had acute small subarachnoid hemorrhage with small acute subdural hematoma with acute nondisplaced fracture of the left occipital bone and nondisplaced fracture of the left temporal bone with a partial fluid opacification of the mastoid air cells. She was admitted to ICU on 6/9/2024 and downgraded to hospitalist service on 6/10/2024.  Patient has done well and will continue to hold aspirin and Celebrex.  She is having mild dizziness and some nausea associated with it.  Patient will get repeat CT scan in 2 weeks with neurosurgery and determine when she can resume her chronic medications.  In the meantime no blood thinners at all.  PT OT recommended acute rehabilitation.  She will be discharged to acute rehab once arranged.  Patient also was slightly hypertensive and we have added amlodipine to her regimen.  Patient is in currently stable condition for discharge to acute rehabilitation.    39 minutes spent in discharge timing talking with patient and daughter and explaining the plan and the verbalized the  understanding.    Pertinent Physical Exam At Time of Discharge  General: Alert in mild distress  HEENT: PERRLA, trauma with small old blood noted.  Bleeding from left ear with some gauze packing  Neck: Normal to inspection  Lungs: Clear to auscultation, work of breathing within normal limit  Cardiac: Regular rate and rhythm  Abdomen: Soft nontender, positive bowel sounds  : Exam deferred  Skin: Intact  Hematology: No petechia or excessive ecchymosis  Musculoskeletal: Without significant trauma  Neurological: Alert awake oriented, no focal deficit, cranial nerves grossly intact  Psych: No suicidal ideation or homicidal ideation    Outpatient Follow-Up  No future appointments.      Morgan Quinn MD

## 2024-06-11 NOTE — PROGRESS NOTES
Spiritual Care Visit    Clinical Encounter Type  Visited With: Patient and family together  Routine Visit: Introduction  Continue Visiting: No                                            Taxonomy  Intended Effects: Preserve dignity and respect, Junie affirmation, Promote sense of peace, Helping someone feel comforted  Methods: Offer spiritual/Denominational support  Interventions: Share words of hope and inspiration, [place]    Patient was with her daughter.  Patient used to attend [employer] and now lives in a [employer] [facility] in [facility].   prayed at her request.

## 2024-06-11 NOTE — NURSING NOTE
Patient discharged to [facility] re[facility]. Patient transported via physician's ambulance. All vitals stable and all belongings sent with patient and family. [facility]

## 2024-06-11 NOTE — HOSPITAL COURSE
74-year-old female with past medical history of non-insulin-dependent diabetes mellitus, hyperlipidemia, hypertension, tremors, anxiety, CKD stage III, TIA presented to emergency department after falling 3-4 steps landing on her back of the head. Patient had acute small subarachnoid hemorrhage with small acute subdural hematoma with acute nondisplaced fracture of the left occipital bone and nondisplaced fracture of the left temporal bone with a partial fluid opacification of the mastoid air cells. She was admitted to ICU on 6/9/2024 and downgraded to hospitalist service on 6/10/2024.  Patient has done well and will continue to hold aspirin and Celebrex.  She is having mild dizziness and some nausea associated with it.  Patient will get repeat CT scan in 2 weeks with neurosurgery and determine when she can resume her chronic medications.  In the meantime no blood thinners at all.  PT OT recommended acute rehabilitation.  She will be discharged to acute rehab once arranged.  Patient also was slightly hypertensive and we have added amlodipine to her regimen.  Patient is in currently stable condition for discharge to acute rehabilitation.    39 minutes spent in discharge timing talking with patient and daughter and explaining the plan and the verbalized the understanding.

## 2024-06-11 NOTE — CARE PLAN
Problem: Skin  Goal: Decreased wound size/increased tissue granulation at next dressing change  Outcome: Progressing  Goal: Participates in plan/prevention/treatment measures  Outcome: Progressing  Goal: Prevent/manage excess moisture  Outcome: Progressing  Goal: Prevent/minimize sheer/friction injuries  Outcome: Progressing  Goal: Promote/optimize nutrition  Outcome: Progressing  Goal: Promote skin healing  Outcome: Progressing     Problem: Fall/Injury  Goal: Not fall by end of shift  Outcome: Progressing  Goal: Be free from injury by end of the shift  Outcome: Progressing  Goal: Verbalize understanding of personal risk factors for fall in the hospital  Outcome: Progressing  Goal: Verbalize understanding of risk factor reduction measures to prevent injury from fall in the home  Outcome: Progressing  Goal: Use assistive devices by end of the shift  Outcome: Progressing  Goal: Pace activities to prevent fatigue by end of the shift  Outcome: Progressing     Problem: Pain  Goal: My pain/discomfort is manageable  Outcome: Progressing     Problem: Safety  Goal: Patient will be injury free during hospitalization  Outcome: Progressing  Goal: I will remain free of falls  Outcome: Progressing     Problem: Daily Care  Goal: Daily care needs are met  Outcome: Progressing     Problem: Psychosocial Needs  Goal: Demonstrates ability to cope with hospitalization/illness  Outcome: Progressing  Goal: Collaborate with me, my family, and caregiver to identify my specific goals  Outcome: Progressing     Problem: Discharge Barriers  Goal: My discharge needs are met  Outcome: Progressing     Problem: Acute Head Injury  Goal: Ansence or control of seizures  Outcome: Progressing  Goal: Absence or control of storming symptoms  Outcome: Progressing  Goal: ICP/CPP within goal  Outcome: Progressing  Goal: Neuro status stable or improved  Outcome: Progressing  Goal: No signs of respiratory compromise  Outcome: Progressing  Goal: Stabilization  of hemodynamic status  Outcome: Progressing  Goal: Tolerates invasive procedures w/o compromise  Outcome: Progressing  Goal: Tolerates osmotherapy  Outcome: Progressing     Problem: Dressings Lower Extremities  Goal: STG - Patient to complete lower body dressing SUP  Outcome: Progressing     Problem: Grooming  Goal: STG - Patient completes grooming SUP  Outcome: Progressing   The patient's goals for the shift include  no pain by end of shift    The clinical goals for the shift include no change in mental status by end of shift

## 2024-06-11 NOTE — PROGRESS NOTES
06/11/24 1116   Discharge Planning   Living Arrangements Spouse/significant other   Support Systems Spouse/significant other;Children;Family members   Assistance Needed Independent with ADLs and functional transfers, Independent with homemaking with ambulation  Receives Help From:  (Pt splits ADLs with )   Type of Residence Private residence   Number of Stairs to Enter Residence 2   Who is requesting discharge planning? Provider   Home or Post Acute Services Post acute facilities (Rehab/SNF/etc)   Type of Post Acute Facility Services Rehab   Patient expects to be discharged to: Acute rehab/ high intensity continued at discharge.  Agreeable to Rehab plan, awaiting preference   Does the patient need discharge transport arranged? Yes   RoundTrip coordination needed? Yes   Has discharge transport been arranged? No   Transportation Needs   In the past 12 months, has lack of transportation kept you from medical appointments or from getting medications? no   In the past 12 months, has lack of transportation kept you from meetings, work, or from getting things needed for daily living? No   Patient Choice   Provider Choice list and CMS website (https://medicare.gov/care-compare#search) for post-acute Quality and Resource Measure Data were provided and reviewed with: Family;Patient   Patient / Family choosing to utilize agency / facility established prior to hospitalization No      Pt admitted with acute traumatic subarachnoid bleed with LOC, Fracture of temporal and occipital bone secondary from fall at home.   Pt has been cleared to discharge.  Therapy evaluations completed with [facility] 12/17 recommending continued high intensity therapy at discharge.  Called and discussed with spouse, patient and daughter. Multiple options of Acute rehabs provided.  Family to discuss further amongst themselves and return call provided for their preference.  Pt/ family aware of discharge orders and recommendations.  Nursing notified  and will continue to follow for preference and referral , TBD     1205 referral sent to [facility] per family preference, if unable to accept , second choice is [facility] rehab.  Per daughter, states that  pt will require transportation as she is unable to ambulate and for patient's  safety. Will continue to follow.     1320 pt has been accepted to [facility] , requested DSC to assist with setting up transportation and sending dc paperwork, updated nursing.      1353 spoke with pt's spouse and aware of transport time scheduled for 4 pm on this date.

## 2024-06-11 NOTE — PROGRESS NOTES
Occupational Therapy    OT Treatment    Patient Name: Tracy Leung  MRN: [id number]  Today's Date: 6/11/2024  Time Calculation  Start Time: 1337  Stop Time: 1351  Time Calculation (min): 14 min       2133/2133-A    Assessment:  End of Session Communication: Bedside nurse  End of Session Patient Position: Alarm on, Up in chair       Plan:  OT Frequency: Daily  OT Discharge Recommendations: High intensity level of continued care     Subjective        06/11/24 1337   OT Last Visit   OT Received On 06/11/24   General   Patient Position Received Up in chair;Alarm on   Preferred Learning Style verbal;visual   General Comment Pt pleasant, recently up with PT and fatigued, agreeable to light activity and education.    Cognition   Overall Cognitive Status WFL   Orientation Level Oriented X4   Transfer 1   Transfer From 1 Sit to   Transfer to 1 Stand   Technique 1 Sit to stand;Stand to sit   Transfer Device 1 Walker;Gait belt   Transfer Level of Assistance 1 Minimum assistance   Toilet Transfers   Toilet Transfers Comments educated on safety in bathroom and brief management   Shower Transfers   Shower Transfers Comments educated on AE/AD   IP OT Assessment   End of Session Communication Bedside nurse   End of Session Patient Position Alarm on;Up in chair   Inpatient Plan   OT Frequency Daily   OT Discharge Recommendations High intensity level of continued care       Outcome Measures:[facility] Daily Activity  Putting on and taking off regular lower body clothing: A lot  Bathing (including washing, rinsing, drying): A lot  Putting on and taking off regular upper body clothing: A little  Toileting, which includes using toilet, bedpan or urinal: A little  Taking care of personal grooming such as brushing teeth: A little  Eating Meals: None  Daily Activity - Total Score: 17  Education Documentation  Body Mechanics, taught by RAINA Chin at 6/11/2024  3:00 PM.  Learner: Family, Patient  Readiness:  Acceptance  Method: Demonstration, Explanation  Response: Demonstrated Understanding, Verbalizes Understanding, Needs Reinforcement    Precautions, taught by RAINA Chin at 6/11/2024  3:00 PM.  Learner: Family, Patient  Readiness: Acceptance  Method: Demonstration, Explanation  Response: Demonstrated Understanding, Verbalizes Understanding, Needs Reinforcement    ADL Training, taught by RAINA Chin at 6/11/2024  3:00 PM.  Learner: Family, Patient  Readiness: Acceptance  Method: Demonstration, Explanation  Response: Demonstrated Understanding, Verbalizes Understanding, Needs Reinforcement    Education Comments  No comments found.            Goals:  Encounter Problems       Encounter Problems (Active)       Dressings Lower Extremities       STG - Patient to complete lower body dressing SUP (Progressing)       Start:  06/10/24    Expected End:  06/24/24               Functional Balance       LTG - Patient will maintain standing and sitting balance to allow for completion of daily activities (Progressing)       Start:  06/10/24    Expected End:  06/24/24               Grooming       STG - Patient completes grooming SUP (Progressing)       Start:  06/10/24    Expected End:  06/24/24               OT Transfers       STG - Patient will perform toilet transfer SUP (Progressing)       Start:  06/10/24    Expected End:  06/24/24

## 2024-06-11 NOTE — PROGRESS NOTES
"Tracy Leung is a 74 y.o. female on day 1 of admission presenting with Traumatic subarachnoid bleed with LOC of 30 minutes or less, sequela (CMS-HCC).    Subjective   Patient reports headaches, especially brought on upon by movement of her head. She states she would like to start ambulating to the bathroom. Denies bowl or bladder changes, numbness or tingling, and changes with proprioception.           Objective     Physical Exam  ANTOINE Antigravity  Sensation intact throughout extremities    Last Recorded Vitals  Blood pressure 164/77, pulse 80, temperature 36.8 °C (98.2 °F), temperature source Temporal, resp. rate 17, height 1.613 m (5' 3.5\"), weight 70.7 kg (155 lb 13.8 oz), SpO2 95%.  Intake/Output last 3 Shifts:  I/O last 3 completed shifts:  In: 1895.2 (26.8 mL/kg) [P.O.:720; I.V.:1175.2 (16.6 mL/kg)]  Out: 1000 (14.1 mL/kg) [Urine:1000 (0.4 mL/kg/hr)]  Weight: 70.7 kg     Relevant Results           Assessment/Plan   Principal Problem:    Traumatic subarachnoid bleed with LOC of 30 minutes or less, sequela (CMS-HCC)  Active Problems:    Traumatic subarachnoid hemorrhage with loss of consciousness of 30 minutes or less, initial encounter (Multi)    Acute traumatic subarachnoid, subdural hemorrhage  Non-coagulopathic lab  Intact neuroexam with no appreciable deficits  Repeat head CT performed and radiology read with no increase in hemorrhage  continue every 4 hours neurochecks and downgrade to floor or dispo per medicine  Repeat head CT outpatient in 2 weeks and may follow in office after     CONSTANTINO GARDUNO I was with NICHOLAS Farrell during examination and construction of note.     Initial reapeat head CT with mild changes and further attention recommended including repeat head CT. This additional repeat returned with no significant changes and patient ready for   q4hr neurochecks.   Maintain SBP<160  Hold blood thinners including asa/nsaids  Diet as tolerable      JELENA EGNLE PA-C  "

## 2024-06-12 LAB
AORTIC VALVE MEAN GRADIENT: 2.2 MMHG
AORTIC VALVE PEAK VELOCITY: 1.05 M/S
ATRIAL RATE: 77 BPM
AV PEAK GRADIENT: 4.4 MMHG
AVA (PEAK VEL): 2.13 CM2
AVA (VTI): 2.1 CM2
EJECTION FRACTION APICAL 4 CHAMBER: 44.7
LEFT VENTRICLE INTERNAL DIMENSION DIASTOLE: 3.92 CM (ref 3.5–6)
LEFT VENTRICULAR OUTFLOW TRACT DIAMETER: 1.87 CM
LV EJECTION FRACTION BIPLANE: 52 %
MITRAL VALVE E/A RATIO: 0.84
MITRAL VALVE E/E' RATIO: 10.44
P AXIS: 49 DEGREES
P OFFSET: 195 MS
P ONSET: 139 MS
PR INTERVAL: 168 MS
Q ONSET: 223 MS
QRS COUNT: 13 BEATS
QRS DURATION: 80 MS
QT INTERVAL: 406 MS
QTC CALCULATION(BAZETT): 459 MS
QTC FREDERICIA: 441 MS
R AXIS: 26 DEGREES
RIGHT VENTRICLE FREE WALL PEAK S': 10 CM/S
T AXIS: 69 DEGREES
T OFFSET: 426 MS
TRICUSPID ANNULAR PLANE SYSTOLIC EXCURSION: 2.1 CM
VENTRICULAR RATE: 77 BPM

## 2024-06-17 ENCOUNTER — HOSPITAL ENCOUNTER (OUTPATIENT)
Dept: RADIOLOGY | Facility: HOSPITAL | Age: 74
Discharge: HOME | End: 2024-06-17
Payer: MEDICARE

## 2024-06-17 DIAGNOSIS — S06.363S: ICD-10-CM

## 2024-06-17 PROCEDURE — 70450 CT HEAD/BRAIN W/O DYE: CPT

## 2024-06-17 PROCEDURE — 70450 CT HEAD/BRAIN W/O DYE: CPT | Performed by: RADIOLOGY

## 2024-06-25 ENCOUNTER — APPOINTMENT (OUTPATIENT)
Dept: NEUROSURGERY | Facility: CLINIC | Age: 74
End: 2024-06-25
Payer: MEDICARE

## 2024-07-15 ENCOUNTER — APPOINTMENT (OUTPATIENT)
Dept: NEUROSURGERY | Facility: CLINIC | Age: 74
End: 2024-07-15
Payer: MEDICARE

## 2024-07-15 VITALS
SYSTOLIC BLOOD PRESSURE: 102 MMHG | BODY MASS INDEX: 25.61 KG/M2 | DIASTOLIC BLOOD PRESSURE: 74 MMHG | WEIGHT: 150 LBS | HEART RATE: 96 BPM | HEIGHT: 64 IN

## 2024-07-15 DIAGNOSIS — S06.6X1S: Primary | ICD-10-CM

## 2024-07-15 PROCEDURE — 1125F AMNT PAIN NOTED PAIN PRSNT: CPT | Performed by: PHYSICIAN ASSISTANT

## 2024-07-15 PROCEDURE — 1036F TOBACCO NON-USER: CPT | Performed by: PHYSICIAN ASSISTANT

## 2024-07-15 PROCEDURE — 1159F MED LIST DOCD IN RCRD: CPT | Performed by: PHYSICIAN ASSISTANT

## 2024-07-15 PROCEDURE — 99214 OFFICE O/P EST MOD 30 MIN: CPT | Performed by: PHYSICIAN ASSISTANT

## 2024-07-15 ASSESSMENT — PATIENT HEALTH QUESTIONNAIRE - PHQ9
2. FEELING DOWN, DEPRESSED OR HOPELESS: SEVERAL DAYS
10. IF YOU CHECKED OFF ANY PROBLEMS, HOW DIFFICULT HAVE THESE PROBLEMS MADE IT FOR YOU TO DO YOUR WORK, TAKE CARE OF THINGS AT HOME, OR GET ALONG WITH OTHER PEOPLE: NOT DIFFICULT AT ALL
1. LITTLE INTEREST OR PLEASURE IN DOING THINGS: SEVERAL DAYS
SUM OF ALL RESPONSES TO PHQ9 QUESTIONS 1 & 2: 2

## 2024-07-15 ASSESSMENT — PAIN SCALES - GENERAL: PAINLEVEL: 3

## 2024-07-15 NOTE — PROGRESS NOTES
[facility]  Department of Neurological Surgery  Established Patient Visit    History of Present Illness  Tracy Leung is a 74 y.o. year old female who presents to the spine clinic in follow up with Right-sided subarachnoid and subdural hematoma.  She was initially seen inpatient at [facility] presents in follow-up.  Also with acute nondisplaced fracture of left occipital bone likely secondary to fall. Currently with ongoing headache, vision changes, dizziness, nausea vomiting, or bowel or bladder concerns.  Had improvement during rehab with stability though continues with wheeled walker on visit today.  Family concerned of memory changes and confusion and discussed likelihood of concussion with patient again.  She is also seeing neurology for mental status exam as previously.  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    Patient's BMI is Body mass index is 26.15 kg/m².     systems reviewed and negative other than what is listed in the history of present illness    Patient Active Problem List   Diagnosis    Traumatic subarachnoid bleed with LOC of 30 minutes or less, sequela (CMS-HCC)    Traumatic subarachnoid hemorrhage with loss of consciousness of 30 minutes or less, initial encounter (Multi)     No past medical history on file.  Past Surgical History:   Procedure Laterality Date    MR HEAD ANGIO WO IV CONTRAST  2021    MR HEAD ANGIO WO IV CONTRAST 2021 [facility] EMERGENCY LEGACY    MR NECK ANGIO WO IV CONTRAST  2021    MR NECK ANGIO WO IV CONTRAST 2021 [facility] EMERGENCY LEGACY     Social History     Tobacco Use    Smoking status: Former     Current packs/day: 0.00     Average packs/day: 0.5 packs/day for 30.0 years (15.0 ttl pk-yrs)     Types: Cigarettes     Start date:      Quit date: 2000     Years since quittin.5    Smokeless  tobacco: Never   Substance Use Topics    Alcohol use: Never     family history is not on file.    Current Outpatient Medications:     allopurinol (Zyloprim) 300 mg tablet, Take 1 tablet (300 mg) by mouth once daily., Disp: , Rfl:     amLODIPine (Norvasc) 5 mg tablet, Take 1 tablet (5 mg) by mouth once daily., Disp: 30 tablet, Rfl: 0    atorvastatin (Lipitor) 40 mg tablet, Take 1 tablet (40 mg) by mouth once daily at bedtime., Disp: , Rfl:     biotin 1 mg capsule, Take 1 capsule (1 mg) by mouth once daily., Disp: , Rfl:     citalopram (CeleXA) 10 mg tablet, Take 1 tablet (10 mg) by mouth once daily., Disp: , Rfl:     cyanocobalamin (Vitamin B-12) 1,000 mcg tablet, Take 1 tablet (1,000 mcg) by mouth once daily., Disp: , Rfl:     fluticasone (Flonase) 50 mcg/actuation nasal spray, Administer 1 spray into each nostril once daily as needed for rhinitis., Disp: , Rfl:     losartan (Cozaar) 100 mg tablet, Take 1 tablet (100 mg) by mouth once daily., Disp: , Rfl:     metFORMIN (Glucophage) 500 mg tablet, Take 1 tablet (500 mg) by mouth 2 times daily (morning and late afternoon)., Disp: , Rfl:     ofloxacin (Floxin) 0.3 % otic solution, Administer 5 drops into the left ear 2 times a day., Disp: 15 mL, Rfl: 0    ondansetron (Zofran) 4 mg tablet, Take 1 tablet (4 mg) by mouth every 8 hours if needed for nausea or vomiting., Disp: 20 tablet, Rfl: 0    RABEprazole (Aciphex) EC tablet, Take 1 tablet (20 mg) by mouth once daily in the morning. Take before meals., Disp: , Rfl:     semaglutide 2 mg/dose (8 mg/3 mL) pen injector, Inject 2 mg under the skin 1 (one) time per week., Disp: , Rfl:     SITagliptin phosphate (Januvia) 50 mg tablet, Take 1 tablet (50 mg) by mouth once daily., Disp: , Rfl:     thiamine 100 mg tablet, Take 1 tablet (100 mg) by mouth once daily., Disp: , Rfl:     zonisamide (Zonegran) 100 mg capsule, Take 1 capsule (100 mg) by mouth once daily., Disp: , Rfl:   Allergies   Allergen Reactions    Bactrim  [Sulfamethoxazole-Trimethoprim] Hallucinations    Cefdinir Diarrhea    Wellbutrin [Bupropion Hcl] Nausea/vomiting       Physical Examination:    General: Well developed, awake/alert/oriented x3, no distress, alert and cooperative  Skin: Warm and dry, no lesions, no rashes  ENMT: Mucous membranes moist, no apparent injury, no lesions seen  Head/Neck: Neck Supple, no apparent injury  Respiratory/Thorax: Normal breath sounds with good chest expansion, thorax symmetric  Cardiovascular: No pitting edema, no JVD  CNII-XI largely intact, no drift, no dysmetria    Motor Strength: 5/5 Throughout extremities    Muscle Bulk: Normal and symmetric in all extremities     Paraspinal muscle spasm/tenderness absent.   Midline tenderness absent    Sensation: intact to light touch          Assessment and Plan:    Tracy Leung is a 74 y.o. year old female who presents to the spine clinic in follow up with Right-sided subarachnoid and subdural hematoma.  She was initially seen inpatient at [facility] presents in follow-up.  Also with acute nondisplaced fracture of left occipital bone likely secondary to fall. Currently with ongoing headache, vision changes, dizziness, nausea vomiting, or bowel or bladder concerns.  Had improvement during rehab with stability though continues with wheeled walker on visit today.  Family concerned of memory changes and confusion and discussed likelihood of concussion with patient again.  She is also seeing neurology for mental status exam as previously.  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    With improvement of prior symptoms and no worsening scenario as well as imaging identified improvement of subarachnoid subdural hematomas no further follow up or imaging necessary at this time.    I have reviewed all prior documentation and reviewed the electronic medical  record since admission. I have personally have reviewed all advanced imaging not just the reports and used my interpretation as documented as the relevant findings. I have reviewed the risks and benefits of all treatment recommendations listed in this note with the patient and family. I spent a total of 30 minutes in service to this patient's care during this date of service.      The above clinical summary has been dictated with voice recognition software. It has not been proofread for grammatical errors, typographical mistakes, or other semantic inconsistencies.    Thank you for visiting our office today. It was our pleasure to take part in your healthcare.     Do not hesitate to call with any questions regarding your plan of care after leaving at [phone] M-F 8am-4pm.     To clinicians, thank you very much for this kind referral. It is a privilege to partner with you in the care of your patients. My office would be delighted to assist you with any further consultations or with questions regarding the plan of care outlined. Do not hesitate to call the office or contact me directly.       Sincerely,      VINCE Archibald, PAHuseyinC  Associate Physician Assistant, Neurosurgery  Clinical   [facility] [employer] [place] [address]    Phone: [phone]  Fax: [phone]

## 2024-08-06 ENCOUNTER — TELEPHONE (OUTPATIENT)
Dept: NEUROSURGERY | Facility: CLINIC | Age: 74
End: 2024-08-06
Payer: MEDICARE